# Patient Record
Sex: MALE | Race: WHITE | URBAN - METROPOLITAN AREA
[De-identification: names, ages, dates, MRNs, and addresses within clinical notes are randomized per-mention and may not be internally consistent; named-entity substitution may affect disease eponyms.]

---

## 2024-06-08 PROBLEM — F20.9 SCHIZOPHRENIA (HCC): Status: ACTIVE | Noted: 2024-06-08

## 2024-06-18 PROBLEM — F29 PSYCHOSIS, UNSPECIFIED PSYCHOSIS TYPE (HCC): Status: ACTIVE | Noted: 2024-06-18

## 2024-06-28 ENCOUNTER — HOSPITAL ENCOUNTER (OUTPATIENT)
Dept: POSTOP/PACU | Facility: HOSPITAL | Age: 59
Discharge: HOME | End: 2024-06-28
Attending: Other
Payer: MEDICARE

## 2024-06-28 ENCOUNTER — ANESTHESIA (OUTPATIENT)
Dept: POSTOP/PACU | Facility: HOSPITAL | Age: 59
End: 2024-06-28
Payer: MEDICARE

## 2024-06-28 ENCOUNTER — ANESTHESIA EVENT (OUTPATIENT)
Dept: POSTOP/PACU | Facility: HOSPITAL | Age: 59
End: 2024-06-28
Payer: MEDICARE

## 2024-06-28 VITALS
WEIGHT: 180.75 LBS | HEIGHT: 74 IN | DIASTOLIC BLOOD PRESSURE: 93 MMHG | SYSTOLIC BLOOD PRESSURE: 136 MMHG | RESPIRATION RATE: 12 BRPM | OXYGEN SATURATION: 98 % | BODY MASS INDEX: 23.2 KG/M2 | HEART RATE: 77 BPM | TEMPERATURE: 98 F

## 2024-06-28 DIAGNOSIS — F29 PSYCHOSIS, UNSPECIFIED PSYCHOSIS TYPE (HCC): ICD-10-CM

## 2024-06-28 DIAGNOSIS — F25.0 SCHIZOAFFECTIVE DISORDER, BIPOLAR TYPE (HCC): ICD-10-CM

## 2024-06-28 DIAGNOSIS — F32.A DEPRESSION: Primary | ICD-10-CM

## 2024-06-28 DIAGNOSIS — Z01.818 PRE-OP TESTING: ICD-10-CM

## 2024-06-28 RX ORDER — NICOTINE POLACRILEX 4 MG
15 LOZENGE BUCCAL
Status: DISCONTINUED | OUTPATIENT
Start: 2024-06-28 | End: 2024-06-28 | Stop reason: HOSPADM

## 2024-06-28 RX ORDER — KETOROLAC TROMETHAMINE 30 MG/ML
15 INJECTION, SOLUTION INTRAMUSCULAR; INTRAVENOUS ONCE
Status: COMPLETED | OUTPATIENT
Start: 2024-06-28 | End: 2024-06-28

## 2024-06-28 RX ORDER — KETAMINE HYDROCHLORIDE 50 MG/ML
INJECTION, SOLUTION INTRAMUSCULAR; INTRAVENOUS AS NEEDED
Status: DISCONTINUED | OUTPATIENT
Start: 2024-06-28 | End: 2024-06-28 | Stop reason: SURG

## 2024-06-28 RX ORDER — GLYCOPYRROLATE 0.2 MG/ML
0.1 INJECTION, SOLUTION INTRAMUSCULAR; INTRAVENOUS ONCE
Status: COMPLETED | OUTPATIENT
Start: 2024-06-28 | End: 2024-06-28

## 2024-06-28 RX ORDER — NALOXONE HYDROCHLORIDE 0.4 MG/ML
0.08 INJECTION, SOLUTION INTRAMUSCULAR; INTRAVENOUS; SUBCUTANEOUS AS NEEDED
Status: DISCONTINUED | OUTPATIENT
Start: 2024-06-28 | End: 2024-06-28 | Stop reason: HOSPADM

## 2024-06-28 RX ORDER — NICOTINE POLACRILEX 4 MG
30 LOZENGE BUCCAL
Status: DISCONTINUED | OUTPATIENT
Start: 2024-06-28 | End: 2024-06-30

## 2024-06-28 RX ORDER — ETOMIDATE 2 MG/ML
INJECTION INTRAVENOUS AS NEEDED
Status: DISCONTINUED | OUTPATIENT
Start: 2024-06-28 | End: 2024-06-28 | Stop reason: SURG

## 2024-06-28 RX ORDER — CAFFEINE AND SODIUM BENZOATE 125 MG/ML
250 INJECTION, SOLUTION INTRAMUSCULAR; INTRAVENOUS ONCE
Status: COMPLETED | OUTPATIENT
Start: 2024-06-28 | End: 2024-06-28

## 2024-06-28 RX ORDER — SODIUM CHLORIDE, SODIUM LACTATE, POTASSIUM CHLORIDE, CALCIUM CHLORIDE 600; 310; 30; 20 MG/100ML; MG/100ML; MG/100ML; MG/100ML
INJECTION, SOLUTION INTRAVENOUS CONTINUOUS
Status: DISCONTINUED | OUTPATIENT
Start: 2024-06-28 | End: 2024-06-30

## 2024-06-28 RX ORDER — KETOROLAC TROMETHAMINE 30 MG/ML
INJECTION, SOLUTION INTRAMUSCULAR; INTRAVENOUS
Status: COMPLETED
Start: 2024-06-28 | End: 2024-06-28

## 2024-06-28 RX ORDER — HYDROMORPHONE HYDROCHLORIDE 1 MG/ML
0.6 INJECTION, SOLUTION INTRAMUSCULAR; INTRAVENOUS; SUBCUTANEOUS EVERY 5 MIN PRN
Status: DISCONTINUED | OUTPATIENT
Start: 2024-06-28 | End: 2024-06-28 | Stop reason: HOSPADM

## 2024-06-28 RX ORDER — SODIUM CHLORIDE, SODIUM LACTATE, POTASSIUM CHLORIDE, CALCIUM CHLORIDE 600; 310; 30; 20 MG/100ML; MG/100ML; MG/100ML; MG/100ML
INJECTION, SOLUTION INTRAVENOUS CONTINUOUS
Status: DISCONTINUED | OUTPATIENT
Start: 2024-06-28 | End: 2024-06-28 | Stop reason: HOSPADM

## 2024-06-28 RX ORDER — CAFFEINE AND SODIUM BENZOATE 125 MG/ML
INJECTION, SOLUTION INTRAMUSCULAR; INTRAVENOUS
Status: COMPLETED
Start: 2024-06-28 | End: 2024-06-28

## 2024-06-28 RX ORDER — PROCHLORPERAZINE EDISYLATE 5 MG/ML
5 INJECTION INTRAMUSCULAR; INTRAVENOUS EVERY 8 HOURS PRN
Status: DISCONTINUED | OUTPATIENT
Start: 2024-06-28 | End: 2024-06-28 | Stop reason: HOSPADM

## 2024-06-28 RX ORDER — SODIUM CHLORIDE, SODIUM LACTATE, POTASSIUM CHLORIDE, CALCIUM CHLORIDE 600; 310; 30; 20 MG/100ML; MG/100ML; MG/100ML; MG/100ML
INJECTION, SOLUTION INTRAVENOUS CONTINUOUS PRN
Status: DISCONTINUED | OUTPATIENT
Start: 2024-06-28 | End: 2024-06-28 | Stop reason: SURG

## 2024-06-28 RX ORDER — DEXTROSE MONOHYDRATE 25 G/50ML
50 INJECTION, SOLUTION INTRAVENOUS
Status: DISCONTINUED | OUTPATIENT
Start: 2024-06-28 | End: 2024-06-28 | Stop reason: HOSPADM

## 2024-06-28 RX ORDER — MEPERIDINE HYDROCHLORIDE 25 MG/ML
12.5 INJECTION INTRAMUSCULAR; INTRAVENOUS; SUBCUTANEOUS AS NEEDED
Status: DISCONTINUED | OUTPATIENT
Start: 2024-06-28 | End: 2024-06-28 | Stop reason: HOSPADM

## 2024-06-28 RX ORDER — ONDANSETRON 2 MG/ML
4 INJECTION INTRAMUSCULAR; INTRAVENOUS EVERY 6 HOURS PRN
Status: DISCONTINUED | OUTPATIENT
Start: 2024-06-28 | End: 2024-06-28 | Stop reason: HOSPADM

## 2024-06-28 RX ORDER — HYDROMORPHONE HYDROCHLORIDE 1 MG/ML
0.2 INJECTION, SOLUTION INTRAMUSCULAR; INTRAVENOUS; SUBCUTANEOUS EVERY 5 MIN PRN
Status: DISCONTINUED | OUTPATIENT
Start: 2024-06-28 | End: 2024-06-28 | Stop reason: HOSPADM

## 2024-06-28 RX ORDER — ONDANSETRON 2 MG/ML
4 INJECTION INTRAMUSCULAR; INTRAVENOUS ONCE
Status: COMPLETED | OUTPATIENT
Start: 2024-06-28 | End: 2024-06-28

## 2024-06-28 RX ORDER — DEXTROSE MONOHYDRATE 25 G/50ML
50 INJECTION, SOLUTION INTRAVENOUS
Status: DISCONTINUED | OUTPATIENT
Start: 2024-06-28 | End: 2024-06-30

## 2024-06-28 RX ORDER — GLYCOPYRROLATE 0.2 MG/ML
INJECTION, SOLUTION INTRAMUSCULAR; INTRAVENOUS
Status: COMPLETED
Start: 2024-06-28 | End: 2024-06-28

## 2024-06-28 RX ORDER — NICOTINE POLACRILEX 4 MG
30 LOZENGE BUCCAL
Status: DISCONTINUED | OUTPATIENT
Start: 2024-06-28 | End: 2024-06-28 | Stop reason: HOSPADM

## 2024-06-28 RX ORDER — HYDROMORPHONE HYDROCHLORIDE 1 MG/ML
0.4 INJECTION, SOLUTION INTRAMUSCULAR; INTRAVENOUS; SUBCUTANEOUS EVERY 5 MIN PRN
Status: DISCONTINUED | OUTPATIENT
Start: 2024-06-28 | End: 2024-06-28 | Stop reason: HOSPADM

## 2024-06-28 RX ORDER — NICOTINE POLACRILEX 4 MG
15 LOZENGE BUCCAL
Status: DISCONTINUED | OUTPATIENT
Start: 2024-06-28 | End: 2024-06-30

## 2024-06-28 RX ORDER — ONDANSETRON 2 MG/ML
INJECTION INTRAMUSCULAR; INTRAVENOUS
Status: COMPLETED
Start: 2024-06-28 | End: 2024-06-28

## 2024-06-28 RX ORDER — LABETALOL HYDROCHLORIDE 5 MG/ML
INJECTION, SOLUTION INTRAVENOUS AS NEEDED
Status: DISCONTINUED | OUTPATIENT
Start: 2024-06-28 | End: 2024-06-28 | Stop reason: SURG

## 2024-06-28 RX ADMIN — GLYCOPYRROLATE 0.1 MG: 0.2 INJECTION, SOLUTION INTRAMUSCULAR; INTRAVENOUS at 06:39:00

## 2024-06-28 RX ADMIN — LABETALOL HYDROCHLORIDE 10 MG: 5 INJECTION, SOLUTION INTRAVENOUS at 06:56:00

## 2024-06-28 RX ADMIN — SODIUM CHLORIDE, SODIUM LACTATE, POTASSIUM CHLORIDE, CALCIUM CHLORIDE: 600; 310; 30; 20 INJECTION, SOLUTION INTRAVENOUS at 06:20:00

## 2024-06-28 RX ADMIN — SODIUM CHLORIDE, SODIUM LACTATE, POTASSIUM CHLORIDE, CALCIUM CHLORIDE: 600; 310; 30; 20 INJECTION, SOLUTION INTRAVENOUS at 06:24:00

## 2024-06-28 RX ADMIN — ETOMIDATE 16 MG: 2 INJECTION INTRAVENOUS at 06:47:00

## 2024-06-28 RX ADMIN — KETOROLAC TROMETHAMINE 15 MG: 30 INJECTION, SOLUTION INTRAMUSCULAR; INTRAVENOUS at 06:45:00

## 2024-06-28 RX ADMIN — ONDANSETRON 4 MG: 2 INJECTION INTRAMUSCULAR; INTRAVENOUS at 06:45:00

## 2024-06-28 RX ADMIN — CAFFEINE AND SODIUM BENZOATE 250 MG: 125 INJECTION, SOLUTION INTRAMUSCULAR; INTRAVENOUS at 06:40:00

## 2024-06-28 RX ADMIN — KETAMINE HYDROCHLORIDE 25 MG: 50 INJECTION, SOLUTION INTRAMUSCULAR; INTRAVENOUS at 06:47:00

## 2024-06-28 RX ADMIN — SODIUM CHLORIDE, SODIUM LACTATE, POTASSIUM CHLORIDE, CALCIUM CHLORIDE 1000 ML: 600; 310; 30; 20 INJECTION, SOLUTION INTRAVENOUS at 06:36:00

## 2024-06-28 NOTE — ANESTHESIA POSTPROCEDURE EVALUATION
[facility]    Dylon Perla Patient Status:  Outpatient   Age/Gender 58 year old male MRN [id number]   Location [facility] POST ANESTHESIA CARE UNIT Attending Dameon Back MD   Hosp Day # 0 PCP No primary care provider on file.       Anesthesia Post-op Note        Procedure Summary       Date: 06/28/24 Room / Location: [facility] Post Anesthesia Care Unit    Anesthesia Start: 0640 Anesthesia Stop: 0654    Procedure: ECT(BEDSIDE) Diagnosis: Psychosis, unspecified psychosis type ([facility])    Scheduled Providers:  Anesthesiologist: Cedric Griffith MD    Anesthesia Type: general ASA Status: 2            Anesthesia Type: general    Vitals Value Taken Time   /112 06/28/24 0654   Temp 98 °F (36.7 °C) 06/28/24 0654   Pulse 113 06/28/24 0654   Resp 16 06/28/24 0654   SpO2 100 % 06/28/24 0654       Patient Location: PACU    Anesthesia Type: general    Airway Patency: patent    Postop Pain Control: adequate    Mental Status: mildly sedated but able to meaningfully participate in the post-anesthesia evaluation    Nausea/Vomiting: none    Cardiopulmonary/Hydration status: stable euvolemic    Complications: no apparent anesthesia related complications    Postop vital signs: stable    Dental Exam: Unchanged from Preop

## 2024-06-28 NOTE — ANESTHESIA PREPROCEDURE EVALUATION
PRE-OP EVALUATION    Patient Name: Dylon Perla    Admit Diagnosis: Psychosis, unspecified psychosis type (HCC) [F29]    Pre-op Diagnosis: * No surgery found *        Anesthesia Procedure: ECT(BEDSIDE)    * Surgery not found *    Pre-op vitals reviewed.        Body mass index is 23.11 kg/m².    Current medications reviewed.  Hospital Medications:   [COMPLETED] ondansetron (Zofran) 4 MG/2ML injection        [COMPLETED] ketorolac (Toradol) 30 MG/ML injection        [COMPLETED] glycopyrrolate (Robinul) 0.2 MG/ML injection        [COMPLETED] caffeine-sodium benzoate 125-125 MG/ML injection        [COMPLETED] acetaminophen (Tylenol Extra Strength) tab 1,000 mg  1,000 mg Oral Once    haloperidol (Haldol) tab 10 mg  10 mg Oral QAM    haloperidol (Haldol) tab 15 mg  15 mg Oral Nightly    cloZAPine (Clozaril) tab 100 mg  100 mg Oral Daily    cloZAPine (Clozaril) tab 600 mg  600 mg Oral Nightly    sertraline (Zoloft) tab 50 mg  50 mg Oral Daily    benztropine (Cogentin) tab 2 mg  2 mg Oral Q4H PRN    Or    benztropine mesylate (Cogentin) 1 mg/mL injection 2 mg  2 mg Intramuscular Q4H PRN    haloperidol (Haldol) tab 5 mg  5 mg Oral Q4H PRN    Or    haloperidol lactate (Haldol) 5 MG/ML injection 5 mg  5 mg Intramuscular Q4H PRN    LORazepam (Ativan) tab 2 mg  2 mg Oral Q4H PRN    Or    LORazepam (Ativan) 2 mg/mL injection 2 mg  2 mg Intramuscular Q4H PRN    magnesium hydroxide (Milk of Magnesia) 400 MG/5ML oral suspension 30 mL  30 mL Oral Nightly PRN    alum-mag hydroxide-simethicone (Maalox) 200-200-20 MG/5ML oral suspension 30 mL  30 mL Oral Q4H PRN    acetaminophen (Tylenol) tab 325 mg  325 mg Oral Q4H PRN    Or    acetaminophen (Tylenol) tab 650 mg  650 mg Oral Q4H PRN    propranolol (Inderal) tab 10 mg  10 mg Oral TID    topiramate (TopaMAX) tab 200 mg  200 mg Oral Daily    atorvastatin (Lipitor) tab 40 mg  40 mg Oral Nightly    lisinopril (Prinivil; Zestril) tab 20 mg  20 mg Oral Daily    metFORMIN (Glucophage) tab  500 mg  500 mg Oral Daily with breakfast       Outpatient Medications:   (Not in a hospital admission)      Allergies: Latex and Penicillins      Anesthesia Evaluation    Patient summary reviewed.    Anesthetic Complications           GI/Hepatic/Renal                                 Cardiovascular                  (+) hypertension                                     Endo/Other      (+) diabetes                            Pulmonary                           Neuro/Psych                     (+) psychiatric history                 Past Surgical History:   Procedure Laterality Date    Colonoscopy       Social History     Socioeconomic History    Marital status: Single   Tobacco Use    Smoking status: Never    Smokeless tobacco: Never   Vaping Use    Vaping status: Never Used   Substance and Sexual Activity    Alcohol use: Never    Drug use: Never     History   Drug Use Unknown     Available pre-op labs reviewed.  Lab Results   Component Value Date    WBC 8.7 06/27/2024    RBC 4.82 06/27/2024    HGB 14.6 06/27/2024    HCT 42.7 06/27/2024    MCV 88.6 06/27/2024    MCH 30.3 06/27/2024    MCHC 34.2 06/27/2024    RDW 13.0 06/27/2024    .0 (L) 06/27/2024     Lab Results   Component Value Date     06/27/2024    K 3.6 06/27/2024     06/27/2024    CO2 28.0 06/27/2024    BUN 17 06/27/2024    CREATSERUM 1.05 06/27/2024     (H) 06/27/2024    CA 9.0 06/27/2024            Airway      Mallampati: II  Mouth opening: 3 FB  TM distance: 4 - 6 cm  Neck ROM: full Cardiovascular    Cardiovascular exam normal.  Rhythm: regular  Rate: normal     Dental             Pulmonary    Pulmonary exam normal.                 Other findings              ASA: 2   Plan: general  NPO status verified and           Plan/risks discussed with: patient                Present on Admission:  **None**

## 2024-06-28 NOTE — PROGRESS NOTES
[facility]  ECT History & Physical    Dlyon Perla Patient Status:  Outpatient   Age/Gender 58 year old male MRN [id number]   Location [facility] POST ANESTHESIA CARE UNIT Attending Dameon Back MD   Hosp Day # 0 PCP No primary care provider on file.     Date: 6/28/2024    Diagnosis: Schizoaffective disorder bipolar type    Procedure:  ECT    HPI:     Patient seen.  Patient noted starting ECT secondary to recurrent severe depression, anxiety, and mood instability.  Patient agreeable for ECT.      Medical History:  Past Medical History:    Diabetes (HCC)    Diabetes mellitus (HCC)    Non-insulin dependent    Essential hypertension    High blood pressure    High cholesterol    Hyperlipidemia       Surgical History:  Past Surgical History:   Procedure Laterality Date    Colonoscopy         Family History:  Family History   Family history unknown: Yes       ROS:  Unremarkable    Physical Exam:   BP (!) 136/93   Pulse 77   Temp 98 °F (36.7 °C)   Resp 12   Ht 74\"   Wt 82 kg (180 lb 12.4 oz)   SpO2 98%   BMI 23.21 kg/m²     General Appearance:    Alert, cooperative, no distress, appears stated age   Head:    Normocephalic, without obvious abnormality, atraumatic   Eyes:    PERRL, conjunctiva/corneas clear, EOM's intact       Nose:   Nares normal, septum midline, mucosa normal, no drainage    or sinus tenderness   Throat:   Lips, mucosa, and tongue normal; teeth and gums normal   Neck:   Supple, symmetrical, trachea midline   Lungs:     Clear to auscultation bilaterally, respirations unlabored    Heart:    Regular rate and rhythm, S1 and S2 normal, no murmur, rub   or gallop   Abdomen:     Soft, non-tender, bowel sounds active all four quadrants,     no masses, no organomegaly   Extremities:   Extremities normal, atraumatic, no cyanosis or edema   Pulses:   2+ and symmetric all extremities   Skin:   Skin color, texture, turgor normal, no rashes or lesions   Neurologic:   CNII-XII intact,  normal strength, sensation and reflexes     throughout     Impressions & Plans: Schizoaffective disorder bipolar type.  Bitemporal ECT    I have discussed the risks and benefits and alternatives with the patient/family.  They understand and agree to proceed with plan of care.    Dameon Back MD

## 2024-06-28 NOTE — PROGRESS NOTES
[facility]  ECT Procedure Note    Dylon Perla Patient Status:  Outpatient   Age/Gender 58 year old male MRN [id number]   Location [facility] POST ANESTHESIA CARE UNIT Attending Dameon Back MD   Hosp Day # 0 PCP No primary care provider on file.     6/28/2024    ECT Number: #1    Diagnosis: Schizoaffective Disorder Bipolar Type. F25.0    Type of ECT:  Bitemporal    Place of Service:  Inpatient    Settings:   1.  Energy Percentage: 70%    2.  Program:  Low 0.5    Pre-ECT Evaluation    Symptoms:  Patient seen.  Patient noted to have periods of mood instability, anxiety, depression, and extremely poor functioning.  Patient agreeable for ECT.  Patient shows capacity for ECT.  Discussed risks and benefits.  Discussed planning on 3 ECT next week.    Risk/Benefits:  Discussed with patient side effects of ECT including headache, teeth, jaw, cardiac, pulmonary, NPO, aspiration, allergic reactions, anesthetic reactions, musculoskeletal, neurologic, morbidity/mortality, potential lack of efficacy, unilateral/bilateral ECT, relapse/maintenance issues, cognitive risks including memory, concentration, cognition, and other risks.    Side Effects:  Patient notes no cognitive/physical complaints    Exam:  Mood: depressed, anxious, and irritable  Affect: Labile  Memory:  intact immediate, recent, remote and as evidenced by ability to present consistent history  Concentration:   distracted  Suicidal ideation: no suicidal ideation    Prior to procedure, reviewed with treatment team correct patient, time of procedure and type of ECT.  Also reviewed with anesthesia pre-ECT medications.    Patient Monitored:  B/P, EKG, EEG, Pulse Ox, Left Ankle Cuff    Pre-ECT Medications: Robinul 0.1 mg IV, Toradol 15 mg IV, Zofran 4 mg IV, and Caffeine 250 mg IV.  Post-ECT hypertension and consider giving hydralazine 10 mg pretreatment next treatment    ECT Medications:  Anesthetic  Etomidate 16 mg IV followed by  ketamine 25 mg IV and Succinylcholine 100 mg IV.  Patient was somewhat prolonged paralysis and consider decreasing succinylcholine to 80 mg next treatment    Seizure Duration:  Motor: 41 seconds       EE seconds    Post-ECT Condition: Post ECT hypertension and mild prolongation of paralysis.  As above.    Post-ECT Medications:   Propofol 50 mg IV and labetalol 10 mg IV    Dameon Back MD

## 2024-07-01 ENCOUNTER — ANESTHESIA EVENT (OUTPATIENT)
Dept: POSTOP/PACU | Facility: HOSPITAL | Age: 59
End: 2024-07-01
Payer: MEDICARE

## 2024-07-01 ENCOUNTER — ANESTHESIA (OUTPATIENT)
Dept: POSTOP/PACU | Facility: HOSPITAL | Age: 59
End: 2024-07-01
Payer: MEDICARE

## 2024-07-01 RX ORDER — KETAMINE HYDROCHLORIDE 50 MG/ML
INJECTION, SOLUTION INTRAMUSCULAR; INTRAVENOUS AS NEEDED
Status: DISCONTINUED | OUTPATIENT
Start: 2024-07-01 | End: 2024-07-01 | Stop reason: SURG

## 2024-07-01 RX ORDER — LABETALOL HYDROCHLORIDE 5 MG/ML
INJECTION, SOLUTION INTRAVENOUS AS NEEDED
Status: DISCONTINUED | OUTPATIENT
Start: 2024-07-01 | End: 2024-07-01 | Stop reason: SURG

## 2024-07-01 RX ORDER — ETOMIDATE 2 MG/ML
INJECTION INTRAVENOUS AS NEEDED
Status: DISCONTINUED | OUTPATIENT
Start: 2024-07-01 | End: 2024-07-01 | Stop reason: SURG

## 2024-07-01 RX ADMIN — KETAMINE HYDROCHLORIDE 25 MG: 50 INJECTION, SOLUTION INTRAMUSCULAR; INTRAVENOUS at 06:49:00

## 2024-07-01 RX ADMIN — ETOMIDATE 16 MG: 2 INJECTION INTRAVENOUS at 06:49:00

## 2024-07-01 RX ADMIN — LABETALOL HYDROCHLORIDE 10 MG: 5 INJECTION, SOLUTION INTRAVENOUS at 06:53:00

## 2024-07-01 NOTE — ANESTHESIA PREPROCEDURE EVALUATION
PRE-OP EVALUATION    Patient Name: Dylon Perla    Admit Diagnosis: Psychosis, unspecified psychosis type (HCC) [F29]    Pre-op Diagnosis: * No surgery found *        Anesthesia Procedure: ECT(BEDSIDE)    * Surgery not found *    Pre-op vitals reviewed.  Temp: 98.2 °F (36.8 °C)  Pulse: 89  Resp: 14  BP: 139/102  SpO2: 98 %  Body mass index is 23.21 kg/m².    Current medications reviewed.  Hospital Medications:   glucose (Dex4) 15 GM/59ML oral liquid 15 g  15 g Oral Q15 Min PRN    Or    glucose (Glutose) 40% oral gel 15 g  15 g Oral Q15 Min PRN    Or    glucose-vitamin C (Dex-4) chewable tab 4 tablet  4 tablet Oral Q15 Min PRN    Or    dextrose 50% injection 50 mL  50 mL Intravenous Q15 Min PRN    Or    glucose (Dex4) 15 GM/59ML oral liquid 30 g  30 g Oral Q15 Min PRN    Or    glucose (Glutose) 40% oral gel 30 g  30 g Oral Q15 Min PRN    Or    glucose-vitamin C (Dex-4) chewable tab 8 tablet  8 tablet Oral Q15 Min PRN    lactated ringers infusion   Intravenous Continuous    [COMPLETED] glycopyrrolate (Robinul) 0.2 MG/ML injection 0.1 mg  0.1 mg Intravenous Once    [COMPLETED] ketorolac (Toradol) 30 MG/ML injection 15 mg  15 mg Intravenous Once    [COMPLETED] ondansetron (Zofran) 4 MG/2ML injection 4 mg  4 mg Intravenous Once    [COMPLETED] caffeine-sodium benzoate 125-125 mg/mL injection  250 mg Intravenous Once    [COMPLETED] hydrALAzine (Apresoline) 20 mg/mL injection 10 mg  10 mg Intravenous Once    traZODone (Desyrel) tab 100 mg  100 mg Oral Nightly    [COMPLETED] ketamine (Ketalar) 50 MG/ML injection        [COMPLETED] acetaminophen (Tylenol Extra Strength) tab 1,000 mg  1,000 mg Oral Once    haloperidol (Haldol) tab 10 mg  10 mg Oral QAM    haloperidol (Haldol) tab 15 mg  15 mg Oral Nightly    cloZAPine (Clozaril) tab 100 mg  100 mg Oral Daily    cloZAPine (Clozaril) tab 600 mg  600 mg Oral Nightly    sertraline (Zoloft) tab 50 mg  50 mg Oral Daily    benztropine (Cogentin) tab 2 mg  2 mg Oral Q4H PRN    Or     benztropine mesylate (Cogentin) 1 mg/mL injection 2 mg  2 mg Intramuscular Q4H PRN    haloperidol (Haldol) tab 5 mg  5 mg Oral Q4H PRN    Or    haloperidol lactate (Haldol) 5 MG/ML injection 5 mg  5 mg Intramuscular Q4H PRN    LORazepam (Ativan) tab 2 mg  2 mg Oral Q4H PRN    Or    LORazepam (Ativan) 2 mg/mL injection 2 mg  2 mg Intramuscular Q4H PRN    magnesium hydroxide (Milk of Magnesia) 400 MG/5ML oral suspension 30 mL  30 mL Oral Nightly PRN    alum-mag hydroxide-simethicone (Maalox) 200-200-20 MG/5ML oral suspension 30 mL  30 mL Oral Q4H PRN    acetaminophen (Tylenol) tab 325 mg  325 mg Oral Q4H PRN    Or    acetaminophen (Tylenol) tab 650 mg  650 mg Oral Q4H PRN    propranolol (Inderal) tab 10 mg  10 mg Oral TID    topiramate (TopaMAX) tab 200 mg  200 mg Oral Daily    atorvastatin (Lipitor) tab 40 mg  40 mg Oral Nightly    lisinopril (Prinivil; Zestril) tab 20 mg  20 mg Oral Daily    metFORMIN (Glucophage) tab 500 mg  500 mg Oral Daily with breakfast       Outpatient Medications:   (Not in a hospital admission)      Allergies: Latex and Penicillins      Anesthesia Evaluation    Patient summary reviewed.    Anesthetic Complications           GI/Hepatic/Renal                                 Cardiovascular                  (+) hypertension                                     Endo/Other      (+) diabetes                            Pulmonary                           Neuro/Psych                     (+) psychiatric history                 Past Surgical History:   Procedure Laterality Date    Colonoscopy       Social History     Socioeconomic History    Marital status: Single   Tobacco Use    Smoking status: Never    Smokeless tobacco: Never   Vaping Use    Vaping status: Never Used   Substance and Sexual Activity    Alcohol use: Never    Drug use: Never     History   Drug Use Unknown     Available pre-op labs reviewed.  Lab Results   Component Value Date    WBC 7.4 06/30/2024    RBC 4.56 06/30/2024    HGB 13.6  06/30/2024    HCT 40.2 06/30/2024    MCV 88.2 06/30/2024    MCH 29.8 06/30/2024    MCHC 33.8 06/30/2024    RDW 12.8 06/30/2024    .0 (L) 06/30/2024     Lab Results   Component Value Date     06/27/2024    K 3.6 06/27/2024     06/27/2024    CO2 28.0 06/27/2024    BUN 17 06/27/2024    CREATSERUM 1.05 06/27/2024     (H) 06/27/2024    CA 9.0 06/27/2024            Airway      Mallampati: II  Mouth opening: 3 FB  TM distance: 4 - 6 cm  Neck ROM: full Cardiovascular    Cardiovascular exam normal.  Rhythm: regular  Rate: normal     Dental             Pulmonary    Pulmonary exam normal.                 Other findings              ASA: 3   Plan: general  NPO status verified and patient meets guidelines.  Patient has taken beta blockers in last 24 hours.      Comment: All anesthetic related questions were addressed.  Pt expressed understanding of and agreement with the anesthetic plan.      Plan/risks discussed with: patient                Present on Admission:  **None**

## 2024-07-01 NOTE — ANESTHESIA POSTPROCEDURE EVALUATION
[facility]    Dylon Perla Patient Status:  Outpatient   Age/Gender 58 year old male MRN [id number]   Location [facility] POST ANESTHESIA CARE UNIT Attending Dameon Back MD   Hosp Day # 0 PCP No primary care provider on file.       Anesthesia Post-op Note        Procedure Summary       Date: 07/01/24 Room / Location: [facility] Post Anesthesia Care Unit    Anesthesia Start: 0648 Anesthesia Stop: 0700    Procedure: ECT(BEDSIDE) Diagnosis: Psychosis, unspecified psychosis type ([facility])    Scheduled Providers:  Responsible Provider: Chet Pleitez MD    Anesthesia Type: general ASA Status: 3            Anesthesia Type: general    Vitals Value Taken Time   /97 07/01/24 0700   Temp  07/01/24 0701   Pulse 83 07/01/24 0700   Resp 10 07/01/24 0700   SpO2 97 % 07/01/24 0700       Patient Location: PACU    Anesthesia Type: general    Airway Patency: patent and extubated    Postop Pain Control: adequate    Mental Status: mildly sedated but able to meaningfully participate in the post-anesthesia evaluation    Nausea/Vomiting: none    Cardiopulmonary/Hydration status: stable euvolemic    Complications: no apparent anesthesia related complications    Postop vital signs: stable    Dental Exam: Unchanged from Preop    Patient to be discharged from PACU when criteria met.

## 2024-07-03 ENCOUNTER — ANESTHESIA (OUTPATIENT)
Dept: POSTOP/PACU | Facility: HOSPITAL | Age: 59
End: 2024-07-03
Payer: MEDICARE

## 2024-07-03 ENCOUNTER — ANESTHESIA EVENT (OUTPATIENT)
Dept: POSTOP/PACU | Facility: HOSPITAL | Age: 59
End: 2024-07-03
Payer: MEDICARE

## 2024-07-03 RX ORDER — ETOMIDATE 2 MG/ML
INJECTION INTRAVENOUS AS NEEDED
Status: DISCONTINUED | OUTPATIENT
Start: 2024-07-03 | End: 2024-07-03 | Stop reason: SURG

## 2024-07-03 RX ORDER — SODIUM CHLORIDE, SODIUM LACTATE, POTASSIUM CHLORIDE, CALCIUM CHLORIDE 600; 310; 30; 20 MG/100ML; MG/100ML; MG/100ML; MG/100ML
INJECTION, SOLUTION INTRAVENOUS CONTINUOUS PRN
Status: DISCONTINUED | OUTPATIENT
Start: 2024-07-03 | End: 2024-07-03 | Stop reason: SURG

## 2024-07-03 RX ORDER — KETAMINE HYDROCHLORIDE 50 MG/ML
INJECTION, SOLUTION INTRAMUSCULAR; INTRAVENOUS AS NEEDED
Status: DISCONTINUED | OUTPATIENT
Start: 2024-07-03 | End: 2024-07-03 | Stop reason: SURG

## 2024-07-03 RX ADMIN — ETOMIDATE 16 MG: 2 INJECTION INTRAVENOUS at 06:50:00

## 2024-07-03 RX ADMIN — KETAMINE HYDROCHLORIDE 25 MG: 50 INJECTION, SOLUTION INTRAMUSCULAR; INTRAVENOUS at 06:50:00

## 2024-07-03 RX ADMIN — SODIUM CHLORIDE, SODIUM LACTATE, POTASSIUM CHLORIDE, CALCIUM CHLORIDE: 600; 310; 30; 20 INJECTION, SOLUTION INTRAVENOUS at 06:40:00

## 2024-07-03 NOTE — ANESTHESIA PREPROCEDURE EVALUATION
PRE-OP EVALUATION    Patient Name: Dylon Perla    Admit Diagnosis: Psychosis, unspecified psychosis type ([facility]) [F29]    Pre-op Diagnosis: * No surgery found *        Anesthesia Procedure: ECT(BEDSIDE)    * Surgery not found *    Pre-op vitals reviewed.  Temp: 97.6 °F (36.4 °C)  Pulse: 83  Resp: 16  BP: 135/89  SpO2: 97 %  Body mass index is 23.88 kg/m².    Current medications reviewed.  Hospital Medications:   glucose (Dex4) 15 GM/59ML oral liquid 15 g  15 g Oral Q15 Min PRN    Or    glucose (Glutose) 40% oral gel 15 g  15 g Oral Q15 Min PRN    Or    glucose-vitamin C (Dex-4) chewable tab 4 tablet  4 tablet Oral Q15 Min PRN    Or    dextrose 50% injection 50 mL  50 mL Intravenous Q15 Min PRN    Or    glucose (Dex4) 15 GM/59ML oral liquid 30 g  30 g Oral Q15 Min PRN    Or    glucose (Glutose) 40% oral gel 30 g  30 g Oral Q15 Min PRN    Or    glucose-vitamin C (Dex-4) chewable tab 8 tablet  8 tablet Oral Q15 Min PRN    lactated ringers infusion   Intravenous Continuous    lactated ringers infusion   Intravenous Continuous    [COMPLETED] glycopyrrolate (Robinul) 0.2 MG/ML injection 0.1 mg  0.1 mg Intravenous Once    [COMPLETED] ketorolac (Toradol) 30 MG/ML injection 15 mg  15 mg Intravenous Once    [COMPLETED] ondansetron (Zofran) 4 MG/2ML injection 4 mg  4 mg Intravenous Once    [COMPLETED] caffeine-sodium benzoate 125-125 mg/mL injection  500 mg Intravenous Once    [COMPLETED] hydrALAzine (Apresoline) 20 mg/mL injection 20 mg  20 mg Intravenous Once    [COMPLETED] acetaminophen (Tylenol Extra Strength) tab 1,000 mg  1,000 mg Oral Once    traZODone (Desyrel) tab 100 mg  100 mg Oral Nightly    haloperidol (Haldol) tab 10 mg  10 mg Oral QAM    haloperidol (Haldol) tab 15 mg  15 mg Oral Nightly    cloZAPine (Clozaril) tab 100 mg  100 mg Oral Daily    cloZAPine (Clozaril) tab 600 mg  600 mg Oral Nightly    sertraline (Zoloft) tab 50 mg  50 mg Oral Daily    benztropine (Cogentin) tab 2 mg  2 mg Oral Q4H PRN    Or     benztropine mesylate (Cogentin) 1 mg/mL injection 2 mg  2 mg Intramuscular Q4H PRN    haloperidol (Haldol) tab 5 mg  5 mg Oral Q4H PRN    Or    haloperidol lactate (Haldol) 5 MG/ML injection 5 mg  5 mg Intramuscular Q4H PRN    LORazepam (Ativan) tab 2 mg  2 mg Oral Q4H PRN    Or    LORazepam (Ativan) 2 mg/mL injection 2 mg  2 mg Intramuscular Q4H PRN    magnesium hydroxide (Milk of Magnesia) 400 MG/5ML oral suspension 30 mL  30 mL Oral Nightly PRN    alum-mag hydroxide-simethicone (Maalox) 200-200-20 MG/5ML oral suspension 30 mL  30 mL Oral Q4H PRN    acetaminophen (Tylenol) tab 325 mg  325 mg Oral Q4H PRN    Or    acetaminophen (Tylenol) tab 650 mg  650 mg Oral Q4H PRN    propranolol (Inderal) tab 10 mg  10 mg Oral TID    topiramate (TopaMAX) tab 200 mg  200 mg Oral Daily    atorvastatin (Lipitor) tab 40 mg  40 mg Oral Nightly    lisinopril (Prinivil; Zestril) tab 20 mg  20 mg Oral Daily    metFORMIN (Glucophage) tab 500 mg  500 mg Oral Daily with breakfast       Outpatient Medications:   (Not in a hospital admission)      Allergies: Latex and Penicillins      Anesthesia Evaluation    Patient summary reviewed.    Anesthetic Complications           GI/Hepatic/Renal                                 Cardiovascular                  (+) hypertension                                     Endo/Other      (+) diabetes                            Pulmonary                           Neuro/Psych                     (+) psychiatric history                 Past Surgical History:   Procedure Laterality Date    Colonoscopy       Social History     Socioeconomic History    Marital status: Single   Tobacco Use    Smoking status: Never    Smokeless tobacco: Never   Vaping Use    Vaping status: Never Used   Substance and Sexual Activity    Alcohol use: Never    Drug use: Never     History   Drug Use Unknown     Available pre-op labs reviewed.  Lab Results   Component Value Date    WBC 7.4 06/30/2024    RBC 4.56 06/30/2024    HGB 13.6  06/30/2024    HCT 40.2 06/30/2024    MCV 88.2 06/30/2024    MCH 29.8 06/30/2024    MCHC 33.8 06/30/2024    RDW 12.8 06/30/2024    .0 (L) 06/30/2024     Lab Results   Component Value Date     06/27/2024    K 3.6 06/27/2024     06/27/2024    CO2 28.0 06/27/2024    BUN 17 06/27/2024    CREATSERUM 1.05 06/27/2024     (H) 06/27/2024    CA 9.0 06/27/2024            Airway      Mallampati: II  Mouth opening: 3 FB  TM distance: 4 - 6 cm  Neck ROM: full Cardiovascular    Cardiovascular exam normal.  Rhythm: regular  Rate: normal     Dental             Pulmonary    Pulmonary exam normal.                 Other findings              ASA: 3   Plan: general  NPO status verified and patient meets guidelines.  Patient has taken beta blockers in last 24 hours.      Comment: All anesthetic related questions were addressed.  Pt expressed understanding of and agreement with the anesthetic plan.      Plan/risks discussed with: patient                Present on Admission:  **None**

## 2024-07-05 ENCOUNTER — ANESTHESIA EVENT (OUTPATIENT)
Dept: POSTOP/PACU | Facility: HOSPITAL | Age: 59
End: 2024-07-05
Payer: MEDICARE

## 2024-07-05 ENCOUNTER — ANESTHESIA (OUTPATIENT)
Dept: POSTOP/PACU | Facility: HOSPITAL | Age: 59
End: 2024-07-05
Payer: MEDICARE

## 2024-07-05 RX ORDER — LABETALOL HYDROCHLORIDE 5 MG/ML
INJECTION, SOLUTION INTRAVENOUS AS NEEDED
Status: DISCONTINUED | OUTPATIENT
Start: 2024-07-05 | End: 2024-07-05 | Stop reason: SURG

## 2024-07-05 RX ORDER — ETOMIDATE 2 MG/ML
INJECTION INTRAVENOUS AS NEEDED
Status: DISCONTINUED | OUTPATIENT
Start: 2024-07-05 | End: 2024-07-05 | Stop reason: SURG

## 2024-07-05 RX ADMIN — LABETALOL HYDROCHLORIDE 10 MG: 5 INJECTION, SOLUTION INTRAVENOUS at 06:54:00

## 2024-07-05 RX ADMIN — SODIUM CHLORIDE, SODIUM LACTATE, POTASSIUM CHLORIDE, CALCIUM CHLORIDE: 600; 310; 30; 20 INJECTION, SOLUTION INTRAVENOUS at 07:02:00

## 2024-07-05 RX ADMIN — ETOMIDATE 16 MG: 2 INJECTION INTRAVENOUS at 06:49:00

## 2024-07-05 NOTE — ANESTHESIA POSTPROCEDURE EVALUATION
[facility]    Dylon Perla Patient Status:  Outpatient   Age/Gender 58 year old male MRN [id number]   Location [facility] POST ANESTHESIA CARE UNIT Attending Dameon Back MD   Hosp Day # 0 PCP No primary care provider on file.       Anesthesia Post-op Note        Procedure Summary       Date: 07/05/24 Room / Location: [facility] Post Anesthesia Care Unit    Anesthesia Start: 0648 Anesthesia Stop: 0702    Procedure: ECT(BEDSIDE) Diagnosis: Psychosis, unspecified psychosis type ([facility])    Scheduled Providers: Arnaud Parnell MD Anesthesiologist: Donnell Oreilly MD    Anesthesia Type: general ASA Status: 2            Anesthesia Type: general    Vitals Value Taken Time   /76 07/05/24 0702   Temp n 07/05/24 0702   Pulse 79 07/05/24 0702   Resp 14 07/05/24 0702   SpO2 99 07/05/24 0702       Patient Location: PACU    Anesthesia Type: general    Airway Patency: patent    Postop Pain Control: adequate    Mental Status: mildly sedated but able to meaningfully participate in the post-anesthesia evaluation    Nausea/Vomiting: none    Cardiopulmonary/Hydration status: stable euvolemic    Complications: no apparent anesthesia related complications    Postop vital signs: stable    Dental Exam: Unchanged from Preop

## 2024-07-05 NOTE — ANESTHESIA PREPROCEDURE EVALUATION
PRE-OP EVALUATION    Patient Name: Dylon Perla    Admit Diagnosis: Psychosis, unspecified psychosis type ([facility]) [F29]    Pre-op Diagnosis: * No surgery found *        Anesthesia Procedure: ECT(BEDSIDE)    * Surgery not found *    Pre-op vitals reviewed.  Temp: 98.2 °F (36.8 °C)  Pulse: 90  Resp: 14  BP: 150/104  SpO2: 99 %  Body mass index is 24.77 kg/m².    Current medications reviewed.  Hospital Medications:  • glucose (Dex4) 15 GM/59ML oral liquid 15 g  15 g Oral Q15 Min PRN    Or   • glucose (Glutose) 40% oral gel 15 g  15 g Oral Q15 Min PRN    Or   • glucose-vitamin C (Dex-4) chewable tab 4 tablet  4 tablet Oral Q15 Min PRN    Or   • dextrose 50% injection 50 mL  50 mL Intravenous Q15 Min PRN    Or   • glucose (Dex4) 15 GM/59ML oral liquid 30 g  30 g Oral Q15 Min PRN    Or   • glucose (Glutose) 40% oral gel 30 g  30 g Oral Q15 Min PRN    Or   • glucose-vitamin C (Dex-4) chewable tab 8 tablet  8 tablet Oral Q15 Min PRN   • lactated ringers infusion   Intravenous Continuous   • lactated ringers infusion   Intravenous Continuous   • [COMPLETED] glycopyrrolate (Robinul) 0.2 MG/ML injection 0.1 mg  0.1 mg Intravenous Once   • [COMPLETED] ketorolac (Toradol) 30 MG/ML injection 15 mg  15 mg Intravenous Once   • [COMPLETED] ondansetron (Zofran) 4 MG/2ML injection 4 mg  4 mg Intravenous Once   • [COMPLETED] caffeine-sodium benzoate 125-125 mg/mL injection  500 mg Intravenous Once   • [COMPLETED] hydrALAzine (Apresoline) 20 mg/mL injection 20 mg  20 mg Intravenous Once   • [COMPLETED] acetaminophen (Tylenol Extra Strength) tab 1,000 mg  1,000 mg Oral Once   • traZODone (Desyrel) tab 100 mg  100 mg Oral Nightly   • haloperidol (Haldol) tab 10 mg  10 mg Oral QAM   • haloperidol (Haldol) tab 15 mg  15 mg Oral Nightly   • cloZAPine (Clozaril) tab 100 mg  100 mg Oral Daily   • cloZAPine (Clozaril) tab 600 mg  600 mg Oral Nightly   • sertraline (Zoloft) tab 50 mg  50 mg Oral Daily   • benztropine (Cogentin) tab 2 mg  2 mg  Oral Q4H PRN    Or   • benztropine mesylate (Cogentin) 1 mg/mL injection 2 mg  2 mg Intramuscular Q4H PRN   • haloperidol (Haldol) tab 5 mg  5 mg Oral Q4H PRN    Or   • haloperidol lactate (Haldol) 5 MG/ML injection 5 mg  5 mg Intramuscular Q4H PRN   • LORazepam (Ativan) tab 2 mg  2 mg Oral Q4H PRN    Or   • LORazepam (Ativan) 2 mg/mL injection 2 mg  2 mg Intramuscular Q4H PRN   • magnesium hydroxide (Milk of Magnesia) 400 MG/5ML oral suspension 30 mL  30 mL Oral Nightly PRN   • alum-mag hydroxide-simethicone (Maalox) 200-200-20 MG/5ML oral suspension 30 mL  30 mL Oral Q4H PRN   • acetaminophen (Tylenol) tab 325 mg  325 mg Oral Q4H PRN    Or   • acetaminophen (Tylenol) tab 650 mg  650 mg Oral Q4H PRN   • propranolol (Inderal) tab 10 mg  10 mg Oral TID   • topiramate (TopaMAX) tab 200 mg  200 mg Oral Daily   • atorvastatin (Lipitor) tab 40 mg  40 mg Oral Nightly   • lisinopril (Prinivil; Zestril) tab 20 mg  20 mg Oral Daily   • metFORMIN (Glucophage) tab 500 mg  500 mg Oral Daily with breakfast       Outpatient Medications:   (Not in a hospital admission)      Allergies: Latex and Penicillins      Anesthesia Evaluation    Patient summary reviewed.    Anesthetic Complications           GI/Hepatic/Renal                                 Cardiovascular                  (+) hypertension                                     Endo/Other      (+) diabetes                            Pulmonary                           Neuro/Psych                     (+) psychiatric history             Past Surgical History:   Procedure Laterality Date   • Colonoscopy       Social History     Socioeconomic History   • Marital status: Single   Tobacco Use   • Smoking status: Never   • Smokeless tobacco: Never   Vaping Use   • Vaping status: Never Used   Substance and Sexual Activity   • Alcohol use: Never   • Drug use: Never     History   Drug Use Unknown     Available pre-op labs reviewed.  Lab Results   Component Value Date    WBC 8.7 07/03/2024     RBC 4.39 07/03/2024    HGB 13.5 07/03/2024    HCT 39.3 07/03/2024    MCV 89.5 07/03/2024    MCH 30.8 07/03/2024    MCHC 34.4 07/03/2024    RDW 13.2 07/03/2024    .0 (L) 07/03/2024     Lab Results   Component Value Date     06/27/2024    K 3.6 06/27/2024     06/27/2024    CO2 28.0 06/27/2024    BUN 17 06/27/2024    CREATSERUM 1.05 06/27/2024     (H) 06/27/2024    CA 9.0 06/27/2024            Airway      Mallampati: II  Mouth opening: 3 FB  TM distance: 4 - 6 cm  Neck ROM: full Cardiovascular    Cardiovascular exam normal.         Dental  Comment: Normal wear/minor imperfections and discoloration noted. No grossly weakened or damaged teeth on exam.           Pulmonary    Pulmonary exam normal.                 Other findings        ASA: 2   Plan: general  NPO status verified and patient meets guidelines.        Comment: GA with mask vent              Present on Admission:  **None**

## 2024-07-08 ENCOUNTER — ANESTHESIA EVENT (OUTPATIENT)
Dept: POSTOP/PACU | Facility: HOSPITAL | Age: 59
End: 2024-07-08
Payer: MEDICARE

## 2024-07-08 ENCOUNTER — ANESTHESIA (OUTPATIENT)
Dept: POSTOP/PACU | Facility: HOSPITAL | Age: 59
End: 2024-07-08
Payer: MEDICARE

## 2024-07-08 ENCOUNTER — HOSPITAL ENCOUNTER (OUTPATIENT)
Dept: POSTOP/PACU | Facility: HOSPITAL | Age: 59
Discharge: HOME | End: 2024-07-08
Attending: Other
Payer: MEDICARE

## 2024-07-08 VITALS
RESPIRATION RATE: 16 BRPM | HEART RATE: 89 BPM | OXYGEN SATURATION: 99 % | TEMPERATURE: 98 F | BODY MASS INDEX: 25 KG/M2 | DIASTOLIC BLOOD PRESSURE: 99 MMHG | SYSTOLIC BLOOD PRESSURE: 151 MMHG | HEIGHT: 74 IN

## 2024-07-08 DIAGNOSIS — F20.9 SCHIZOPHRENIA, UNSPECIFIED TYPE (HCC): ICD-10-CM

## 2024-07-08 RX ORDER — CAFFEINE AND SODIUM BENZOATE 125 MG/ML
INJECTION, SOLUTION INTRAMUSCULAR; INTRAVENOUS
Status: COMPLETED
Start: 2024-07-08 | End: 2024-07-08

## 2024-07-08 RX ORDER — NICOTINE POLACRILEX 4 MG
15 LOZENGE BUCCAL
Status: DISCONTINUED | OUTPATIENT
Start: 2024-07-08 | End: 2024-07-08 | Stop reason: HOSPADM

## 2024-07-08 RX ORDER — SODIUM CHLORIDE, SODIUM LACTATE, POTASSIUM CHLORIDE, CALCIUM CHLORIDE 600; 310; 30; 20 MG/100ML; MG/100ML; MG/100ML; MG/100ML
INJECTION, SOLUTION INTRAVENOUS CONTINUOUS
Status: DISCONTINUED | OUTPATIENT
Start: 2024-07-08 | End: 2024-07-08 | Stop reason: HOSPADM

## 2024-07-08 RX ORDER — KETOROLAC TROMETHAMINE 30 MG/ML
15 INJECTION, SOLUTION INTRAMUSCULAR; INTRAVENOUS ONCE
Status: COMPLETED | OUTPATIENT
Start: 2024-07-08 | End: 2024-07-08

## 2024-07-08 RX ORDER — HYDROMORPHONE HYDROCHLORIDE 1 MG/ML
0.6 INJECTION, SOLUTION INTRAMUSCULAR; INTRAVENOUS; SUBCUTANEOUS EVERY 5 MIN PRN
Status: DISCONTINUED | OUTPATIENT
Start: 2024-07-08 | End: 2024-07-08 | Stop reason: HOSPADM

## 2024-07-08 RX ORDER — HYDROMORPHONE HYDROCHLORIDE 1 MG/ML
0.4 INJECTION, SOLUTION INTRAMUSCULAR; INTRAVENOUS; SUBCUTANEOUS EVERY 5 MIN PRN
Status: DISCONTINUED | OUTPATIENT
Start: 2024-07-08 | End: 2024-07-08 | Stop reason: HOSPADM

## 2024-07-08 RX ORDER — MIDAZOLAM HYDROCHLORIDE 1 MG/ML
1 INJECTION INTRAMUSCULAR; INTRAVENOUS EVERY 5 MIN PRN
Status: DISCONTINUED | OUTPATIENT
Start: 2024-07-08 | End: 2024-07-08 | Stop reason: HOSPADM

## 2024-07-08 RX ORDER — HYDROMORPHONE HYDROCHLORIDE 1 MG/ML
0.2 INJECTION, SOLUTION INTRAMUSCULAR; INTRAVENOUS; SUBCUTANEOUS EVERY 5 MIN PRN
Status: DISCONTINUED | OUTPATIENT
Start: 2024-07-08 | End: 2024-07-08 | Stop reason: HOSPADM

## 2024-07-08 RX ORDER — KETOROLAC TROMETHAMINE 30 MG/ML
INJECTION, SOLUTION INTRAMUSCULAR; INTRAVENOUS
Status: COMPLETED
Start: 2024-07-08 | End: 2024-07-08

## 2024-07-08 RX ORDER — KETAMINE HYDROCHLORIDE 50 MG/ML
INJECTION, SOLUTION INTRAMUSCULAR; INTRAVENOUS AS NEEDED
Status: DISCONTINUED | OUTPATIENT
Start: 2024-07-08 | End: 2024-07-08 | Stop reason: SURG

## 2024-07-08 RX ORDER — SODIUM CHLORIDE, SODIUM LACTATE, POTASSIUM CHLORIDE, CALCIUM CHLORIDE 600; 310; 30; 20 MG/100ML; MG/100ML; MG/100ML; MG/100ML
INJECTION, SOLUTION INTRAVENOUS CONTINUOUS
Status: DISCONTINUED | OUTPATIENT
Start: 2024-07-08 | End: 2024-07-10

## 2024-07-08 RX ORDER — ONDANSETRON 2 MG/ML
INJECTION INTRAMUSCULAR; INTRAVENOUS
Status: COMPLETED
Start: 2024-07-08 | End: 2024-07-08

## 2024-07-08 RX ORDER — CAFFEINE AND SODIUM BENZOATE 125 MG/ML
500 INJECTION, SOLUTION INTRAMUSCULAR; INTRAVENOUS ONCE
Status: COMPLETED | OUTPATIENT
Start: 2024-07-08 | End: 2024-07-08

## 2024-07-08 RX ORDER — GLYCOPYRROLATE 0.2 MG/ML
0.1 INJECTION, SOLUTION INTRAMUSCULAR; INTRAVENOUS ONCE
Status: COMPLETED | OUTPATIENT
Start: 2024-07-08 | End: 2024-07-08

## 2024-07-08 RX ORDER — NICOTINE POLACRILEX 4 MG
30 LOZENGE BUCCAL
Status: DISCONTINUED | OUTPATIENT
Start: 2024-07-08 | End: 2024-07-08 | Stop reason: HOSPADM

## 2024-07-08 RX ORDER — ETOMIDATE 2 MG/ML
INJECTION INTRAVENOUS AS NEEDED
Status: DISCONTINUED | OUTPATIENT
Start: 2024-07-08 | End: 2024-07-08 | Stop reason: SURG

## 2024-07-08 RX ORDER — DEXTROSE MONOHYDRATE 25 G/50ML
50 INJECTION, SOLUTION INTRAVENOUS
Status: DISCONTINUED | OUTPATIENT
Start: 2024-07-08 | End: 2024-07-08 | Stop reason: HOSPADM

## 2024-07-08 RX ORDER — GLYCOPYRROLATE 0.2 MG/ML
INJECTION, SOLUTION INTRAMUSCULAR; INTRAVENOUS
Status: COMPLETED
Start: 2024-07-08 | End: 2024-07-08

## 2024-07-08 RX ORDER — HYDRALAZINE HYDROCHLORIDE 20 MG/ML
20 INJECTION INTRAMUSCULAR; INTRAVENOUS ONCE
Status: COMPLETED | OUTPATIENT
Start: 2024-07-08 | End: 2024-07-08

## 2024-07-08 RX ORDER — ONDANSETRON 2 MG/ML
4 INJECTION INTRAMUSCULAR; INTRAVENOUS ONCE
Status: COMPLETED | OUTPATIENT
Start: 2024-07-08 | End: 2024-07-08

## 2024-07-08 RX ORDER — NALOXONE HYDROCHLORIDE 0.4 MG/ML
80 INJECTION, SOLUTION INTRAMUSCULAR; INTRAVENOUS; SUBCUTANEOUS AS NEEDED
Status: DISCONTINUED | OUTPATIENT
Start: 2024-07-08 | End: 2024-07-08 | Stop reason: HOSPADM

## 2024-07-08 RX ORDER — HYDRALAZINE HYDROCHLORIDE 20 MG/ML
INJECTION INTRAMUSCULAR; INTRAVENOUS
Status: COMPLETED
Start: 2024-07-08 | End: 2024-07-08

## 2024-07-08 RX ADMIN — CAFFEINE AND SODIUM BENZOATE 500 MG: 125 INJECTION, SOLUTION INTRAMUSCULAR; INTRAVENOUS at 06:35:00

## 2024-07-08 RX ADMIN — GLYCOPYRROLATE 0.1 MG: 0.2 INJECTION, SOLUTION INTRAMUSCULAR; INTRAVENOUS at 06:09:00

## 2024-07-08 RX ADMIN — ONDANSETRON 4 MG: 2 INJECTION INTRAMUSCULAR; INTRAVENOUS at 06:17:00

## 2024-07-08 RX ADMIN — SODIUM CHLORIDE, SODIUM LACTATE, POTASSIUM CHLORIDE, CALCIUM CHLORIDE: 600; 310; 30; 20 INJECTION, SOLUTION INTRAVENOUS at 06:07:00

## 2024-07-08 RX ADMIN — KETOROLAC TROMETHAMINE 15 MG: 30 INJECTION, SOLUTION INTRAMUSCULAR; INTRAVENOUS at 06:12:00

## 2024-07-08 RX ADMIN — ETOMIDATE 16 MG: 2 INJECTION INTRAVENOUS at 07:14:00

## 2024-07-08 RX ADMIN — SODIUM CHLORIDE, SODIUM LACTATE, POTASSIUM CHLORIDE, CALCIUM CHLORIDE: 600; 310; 30; 20 INJECTION, SOLUTION INTRAVENOUS at 07:12:00

## 2024-07-08 RX ADMIN — KETAMINE HYDROCHLORIDE 25 MG: 50 INJECTION, SOLUTION INTRAMUSCULAR; INTRAVENOUS at 07:14:00

## 2024-07-08 RX ADMIN — HYDRALAZINE HYDROCHLORIDE 20 MG: 20 INJECTION INTRAMUSCULAR; INTRAVENOUS at 06:14:00

## 2024-07-08 NOTE — PROGRESS NOTES
[facility]  ECT Procedure Note      [facility]  ECT Procedure Note    Dylon Perla Patient Status:  Outpatient   Age/Gender 58 year old male   MRN [id number]    Location [facility] POST ANESTHESIA CARE UNIT Attending No att. providers found   Hosp Day # 0 PCP No primary care provider on file.     ECT Number: #5    Diagnosis: Schizoaffective Disorder Bipolar Type. F25.0    Type of ECT:  Bitemporal    Place of Service:  Inpatient    Settings:   1.  Energy Percentage: 90%    2.  Program:  Low 0.5    Pre-ECT Evaluation    Symptoms:      Prior to procedure, reviewed with treatment team correct patient, time of procedure and type of ECT.  Also reviewed with anesthesia pre-ECT medications    Patient continues to show improvement in his mood in that it is much more even and less irritable.  Patient is pleasant upon interview and appears calmer and more relaxed.  Per staff, patient remains delusional and this delusion has not lessened t with treatments thus far.  Pt continues to believe that he killed a baby and is going to custodial.  Patient is sleeping and eating fairly well.  He denies SI or HI.  Cognitive side effects are mild and tolerable.    The patient continues to retain capacity to consent for ECT.    Risk/Benefits:  Discussed with patient side effects of ECT including headache, teeth, jaw, cardiac, pulmonary, NPO, aspiration, allergic reactions, anesthetic reactions, musculoskeletal, neurologic, morbidity/mortality, potential lack of efficacy, unilateral/bilateral ECT, relapse/maintenance issues, cognitive risks including memory, concentration, cognition, and other risks.    Side Effects:  Mild memory complaint    Exam:  Mood:  More even  Affect: Congruent and slightly brighter and more relaxed  Memory:  intact immediate, recent, remote and as evidenced by ability to present consistent history  Concentration:   focused and attentive and as assessed by   ability to concentrate on our conversation  Suicidal ideation: no suicidal ideation    Patient Monitored:  B/P, EKG, EEG, Pulse Ox, Left Ankle Cuff    Pre-ECT Medications:  Robinul 0.1 mg IV, Toradol 15 mg IV, Zofran 4 mg IV, Hydralazine 20 mg IV 30 min prior to treatment, and Caffeine 500 mg IV     ECT Medications:  :  Anesthetic  Etomidate 16 mg IV followed by ketamine 25 mg IV and Succinylcholine 70 mg IV     Seizure Duration:  Motor: 0 seconds       EE seconds    Post-ECT Condition:  Treatment remarkable for no motor seizure and he may want to consider increased caffeine or increased settings    ECT Medications: Propofol 50 mg IV    Aida Drake    2024

## 2024-07-08 NOTE — ANESTHESIA PREPROCEDURE EVALUATION
PRE-OP EVALUATION    Patient Name: Dylon Perla    Admit Diagnosis: Psychosis, unspecified psychosis type (HCC) [F29]    Pre-op Diagnosis: * No surgery found *        Anesthesia Procedure: ECT(BEDSIDE)    * Surgery not found *    Pre-op vitals reviewed.  Temp: 98 °F (36.7 °C)  Pulse: 90  Resp: 16  BP: 142/95  SpO2: 97 %  Body mass index is 24.77 kg/m².    Current medications reviewed.  Hospital Medications:   lactated ringers infusion   Intravenous Continuous    [COMPLETED] glycopyrrolate (Robinul) 0.2 MG/ML injection 0.1 mg  0.1 mg Intravenous Once    [COMPLETED] ketorolac (Toradol) 30 MG/ML injection 15 mg  15 mg Intravenous Once    [COMPLETED] ondansetron (Zofran) 4 MG/2ML injection 4 mg  4 mg Intravenous Once    [COMPLETED] hydrALAzine (Apresoline) 20 mg/mL injection 20 mg  20 mg Intravenous Once    caffeine-sodium benzoate 125-125 mg/mL injection  500 mg Intravenous Once    [COMPLETED] acetaminophen (Tylenol Extra Strength) tab 1,000 mg  1,000 mg Oral Once    [COMPLETED] caffeine-sodium benzoate 125-125 MG/ML injection        traZODone (Desyrel) tab 100 mg  100 mg Oral Nightly    haloperidol (Haldol) tab 10 mg  10 mg Oral QAM    haloperidol (Haldol) tab 15 mg  15 mg Oral Nightly    cloZAPine (Clozaril) tab 100 mg  100 mg Oral Daily    cloZAPine (Clozaril) tab 600 mg  600 mg Oral Nightly    sertraline (Zoloft) tab 50 mg  50 mg Oral Daily    benztropine (Cogentin) tab 2 mg  2 mg Oral Q4H PRN    Or    benztropine mesylate (Cogentin) 1 mg/mL injection 2 mg  2 mg Intramuscular Q4H PRN    haloperidol (Haldol) tab 5 mg  5 mg Oral Q4H PRN    Or    haloperidol lactate (Haldol) 5 MG/ML injection 5 mg  5 mg Intramuscular Q4H PRN    LORazepam (Ativan) tab 2 mg  2 mg Oral Q4H PRN    Or    LORazepam (Ativan) 2 mg/mL injection 2 mg  2 mg Intramuscular Q4H PRN    magnesium hydroxide (Milk of Magnesia) 400 MG/5ML oral suspension 30 mL  30 mL Oral Nightly PRN    alum-mag hydroxide-simethicone (Maalox) 200-200-20 MG/5ML oral  suspension 30 mL  30 mL Oral Q4H PRN    acetaminophen (Tylenol) tab 325 mg  325 mg Oral Q4H PRN    Or    acetaminophen (Tylenol) tab 650 mg  650 mg Oral Q4H PRN    propranolol (Inderal) tab 10 mg  10 mg Oral TID    topiramate (TopaMAX) tab 200 mg  200 mg Oral Daily    atorvastatin (Lipitor) tab 40 mg  40 mg Oral Nightly    lisinopril (Prinivil; Zestril) tab 20 mg  20 mg Oral Daily    metFORMIN (Glucophage) tab 500 mg  500 mg Oral Daily with breakfast       Outpatient Medications:   (Not in a hospital admission)      Allergies: Latex and Penicillins      Anesthesia Evaluation    Patient summary reviewed.    Anesthetic Complications           GI/Hepatic/Renal                                 Cardiovascular                  (+) hypertension                                     Endo/Other      (+) diabetes                            Pulmonary                           Neuro/Psych                     (+) psychiatric history                 Past Surgical History:   Procedure Laterality Date    Colonoscopy       Social History     Socioeconomic History    Marital status: Single   Tobacco Use    Smoking status: Never    Smokeless tobacco: Never   Vaping Use    Vaping status: Never Used   Substance and Sexual Activity    Alcohol use: Never    Drug use: Never     History   Drug Use Unknown     Available pre-op labs reviewed.  Lab Results   Component Value Date    WBC 8.7 07/03/2024    RBC 4.39 07/03/2024    HGB 13.5 07/03/2024    HCT 39.3 07/03/2024    MCV 89.5 07/03/2024    MCH 30.8 07/03/2024    MCHC 34.4 07/03/2024    RDW 13.2 07/03/2024    .0 (L) 07/03/2024     Lab Results   Component Value Date     06/27/2024    K 3.6 06/27/2024     06/27/2024    CO2 28.0 06/27/2024    BUN 17 06/27/2024    CREATSERUM 1.05 06/27/2024     (H) 06/27/2024    CA 9.0 06/27/2024            Airway      Mallampati: II  Mouth opening: 3 FB  TM distance: 4 - 6 cm  Neck ROM: full Cardiovascular    Cardiovascular exam  normal.         Dental  Comment: Normal wear/minor imperfections and discoloration noted. No grossly weakened or damaged teeth on exam.           Pulmonary    Pulmonary exam normal.                 Other findings            ASA: 2   Plan: general  NPO status verified and patient meets guidelines.        Comment: GA with mask vent                Present on Admission:  **None**

## 2024-07-08 NOTE — ANESTHESIA POSTPROCEDURE EVALUATION
[facility]    Dylon Perla Patient Status:  Outpatient   Age/Gender 58 year old male MRN [id number]   Location [facility] POST ANESTHESIA CARE UNIT Attending Dameon Back MD   Hosp Day # 0 PCP No primary care provider on file.       Anesthesia Post-op Note        Procedure Summary       Date: 07/08/24 Room / Location: [facility] Post Anesthesia Care Unit    Anesthesia Start: 0712 Anesthesia Stop: 0726    Procedure: ECT(BEDSIDE) Diagnosis: Schizophrenia, unspecified type ([facility])    Scheduled Providers:  Anesthesiologist: Tiera Snider MD    Anesthesia Type: general ASA Status: 2            Anesthesia Type: general    Vitals Value Taken Time   /103 07/08/24 0919   Temp 97 07/08/24 0919   Pulse 95 07/08/24 0919   Resp 13 07/08/24 0919   SpO2 99 07/08/24 0919       Patient Location: PACU    Anesthesia Type: general    Airway Patency: patent    Postop Pain Control: adequate    Mental Status: preanesthetic baseline    Nausea/Vomiting: none    Cardiopulmonary/Hydration status: stable euvolemic    Complications: no apparent anesthesia related complications    Postop vital signs: stable    Dental Exam: Unchanged from Preop    Patient to be discharged from PACU when criteria met.

## 2024-07-10 ENCOUNTER — ANESTHESIA EVENT (OUTPATIENT)
Dept: POSTOP/PACU | Facility: HOSPITAL | Age: 59
End: 2024-07-10
Payer: MEDICARE

## 2024-07-10 ENCOUNTER — ANESTHESIA (OUTPATIENT)
Dept: POSTOP/PACU | Facility: HOSPITAL | Age: 59
End: 2024-07-10
Payer: MEDICARE

## 2024-07-10 RX ORDER — KETAMINE HYDROCHLORIDE 50 MG/ML
INJECTION, SOLUTION INTRAMUSCULAR; INTRAVENOUS AS NEEDED
Status: DISCONTINUED | OUTPATIENT
Start: 2024-07-10 | End: 2024-07-10 | Stop reason: SURG

## 2024-07-10 RX ORDER — ETOMIDATE 2 MG/ML
INJECTION INTRAVENOUS AS NEEDED
Status: DISCONTINUED | OUTPATIENT
Start: 2024-07-10 | End: 2024-07-10 | Stop reason: SURG

## 2024-07-10 RX ADMIN — ETOMIDATE 16 MG: 2 INJECTION INTRAVENOUS at 06:45:00

## 2024-07-10 RX ADMIN — KETAMINE HYDROCHLORIDE 25 MG: 50 INJECTION, SOLUTION INTRAMUSCULAR; INTRAVENOUS at 06:45:00

## 2024-07-10 NOTE — ANESTHESIA POSTPROCEDURE EVALUATION
[facility]    Dylon Perla Patient Status:  Outpatient   Age/Gender 58 year old male MRN [id number]   Location [facility] POST ANESTHESIA CARE UNIT Attending Dameon Back MD   Hosp Day # 0 PCP No primary care provider on file.       Anesthesia Post-op Note        Procedure Summary       Date: 07/10/24 Room / Location: [facility] Post Anesthesia Care Unit    Anesthesia Start: 0645 Anesthesia Stop: 0658    Procedure: ECT(BEDSIDE) Diagnosis: Schizophrenia, unspecified type ([facility])    Scheduled Providers:  Anesthesiologist: John Montiel MD    Anesthesia Type: general ASA Status: 2            Anesthesia Type: general    Vitals Value Taken Time   /98 07/10/24 0714   Temp  07/10/24 0716   Pulse 84 07/10/24 0716   Resp 11 07/10/24 0716   SpO2 100 % 07/10/24 0716   Vitals shown include unfiled device data.    Patient Location: PACU    Anesthesia Type: general    Airway Patency: patent    Postop Pain Control: adequate    Mental Status: mildly sedated but able to meaningfully participate in the post-anesthesia evaluation    Nausea/Vomiting: none    Cardiopulmonary/Hydration status: stable euvolemic    Complications: no apparent anesthesia related complications    Postop vital signs: stable    Dental Exam: Unchanged from Preop    Patient to be discharged from PACU when criteria met.

## 2024-07-10 NOTE — PROGRESS NOTES
[facility]  ECT History & Physical    Dylon Perla Patient Status:  Outpatient   Age/Gender 58 year old male MRN [id number]   Location [facility] POST ANESTHESIA CARE UNIT Attending Dameon Back MD   Hosp Day # 0 PCP No primary care provider on file.     Date of Service: 7/9/2024    Diagnosis:  Schizoaffective Disorder Bipolar Type. F25.0    Procedure:  Bitemporal    HPI: Significant improvement in mood but still delusional.  No physical complaints      Medical History:  Past Medical History:    Diabetes (HCC)    Diabetes mellitus (HCC)    Non-insulin dependent    Essential hypertension    High blood pressure    High cholesterol    Hyperlipidemia       Surgical History:  Past Surgical History:   Procedure Laterality Date    Colonoscopy         Family History:  Family History   Family history unknown: Yes       Social History:  Social History     Socioeconomic History    Marital status: Single     Spouse name: Not on file    Number of children: Not on file    Years of education: Not on file    Highest education level: Not on file   Occupational History    Not on file   Tobacco Use    Smoking status: Never    Smokeless tobacco: Never   Vaping Use    Vaping status: Never Used   Substance and Sexual Activity    Alcohol use: Never    Drug use: Never    Sexual activity: Not on file   Other Topics Concern    Not on file   Social History Narrative    Not on file     Social Determinants of Health     Financial Resource Strain: Low Risk  (6/19/2024)    Financial Resource Strain     Difficulty of Paying Living Expenses: Not on file     Med Affordability: No   Food Insecurity: No Food Insecurity (6/19/2024)    Food Insecurity     Food Insecurity: Never true   Transportation Needs: No Transportation Needs (6/19/2024)    Transportation Needs     Lack of Transportation: No     Car Seat: Not on file   Physical Activity: Not on file   Stress: Not on file   Social Connections: Not on file   Housing  Stability: Low Risk  (6/19/2024)    Housing Stability     Housing Instability: No     Housing Instability Emergency: Not on file     Crib or [place]in[place]: Not on file       ROS:  unremarkable    Physical Exam:   BP (!) 151/99   Pulse 89   Temp 98.1 °F (36.7 °C) (Temporal)   Resp 16   Ht 74\"   SpO2 99%   BMI 24.77 kg/m²     General Appearance:    Alert, cooperative, no distress, appears stated age   Head:    Normocephalic, without obvious abnormality, atraumatic   Eyes:    PERRL, conjunctiva/corneas clear, EOM's intact   Nose:   Nares normal, septum midline, mucosa normal, no drainage    or sinus tenderness   Throat:   Lips, mucosa, and tongue normal; teeth and gums normal   Neck:   Supple, symmetrical, trachea midline   Lungs:     Clear to auscultation bilaterally, respirations unlabored    Heart:    Regular rate and rhythm, S1 and S2 normal, no murmur, rub or gallop   Abdomen:     Soft, non-tender, bowel sounds active all four quadrants,     no masses, no organomegaly   Extremities:   Extremities normal, atraumatic, no cyanosis or edema   Pulses:   2+ and symmetric all extremities   Skin:   Skin color, texture, turgor normal, no rashes or lesions   Neurologic:   CNII-XII intact, normal strength, sensation and reflexes     throughout     Impressions & Plans:    Diagnosis:  Schizoaffective Disorder Bipolar Type. F25.0    Procedure:  Bitemporal    I have discussed the risks and benefits and alternatives with the patient/family.  They understand and agree to proceed with plan of care.    Aida Drake MD  7/9/2024

## 2024-07-10 NOTE — ANESTHESIA PREPROCEDURE EVALUATION
PRE-OP EVALUATION    Patient Name: Dylon Perla    Admit Diagnosis: Psychosis, unspecified psychosis type (HCC) [F29]    Pre-op Diagnosis: * No surgery found *        Anesthesia Procedure: ECT(BEDSIDE)    * Surgery not found *    Pre-op vitals reviewed.  Temp: 98 °F (36.7 °C)  Pulse: 83  Resp: 16  BP: 136/89  SpO2: 96 %  Body mass index is 24.65 kg/m².    Current medications reviewed.  Hospital Medications:   glucose (Dex4) 15 GM/59ML oral liquid 15 g  15 g Oral Q15 Min PRN    Or    glucose (Glutose) 40% oral gel 15 g  15 g Oral Q15 Min PRN    Or    glucose-vitamin C (Dex-4) chewable tab 4 tablet  4 tablet Oral Q15 Min PRN    Or    dextrose 50% injection 50 mL  50 mL Intravenous Q15 Min PRN    Or    glucose (Dex4) 15 GM/59ML oral liquid 30 g  30 g Oral Q15 Min PRN    Or    glucose (Glutose) 40% oral gel 30 g  30 g Oral Q15 Min PRN    Or    glucose-vitamin C (Dex-4) chewable tab 8 tablet  8 tablet Oral Q15 Min PRN    lactated ringers infusion   Intravenous Continuous    [COMPLETED] glycopyrrolate (Robinul) 0.2 MG/ML injection 0.1 mg  0.1 mg Intravenous Once    [COMPLETED] ketorolac (Toradol) 30 MG/ML injection 15 mg  15 mg Intravenous Once    [COMPLETED] ondansetron (Zofran) 4 MG/2ML injection 4 mg  4 mg Intravenous Once    [COMPLETED] caffeine-sodium benzoate 125-125 mg/mL injection  750 mg Intravenous Once    [COMPLETED] hydrALAzine (Apresoline) 20 mg/mL injection 20 mg  20 mg Intravenous Once    [COMPLETED] ondansetron (Zofran) 4 MG/2ML injection        [COMPLETED] ketorolac (Toradol) 30 MG/ML injection        [COMPLETED] glycopyrrolate (Robinul) 0.2 MG/ML injection        [COMPLETED] caffeine-sodium benzoate 125-125 MG/ML injection        [COMPLETED] hydrALAzine (Apresoline) 20 mg/mL injection        [COMPLETED] acetaminophen (Tylenol Extra Strength) tab 1,000 mg  1,000 mg Oral Once    traZODone (Desyrel) tab 100 mg  100 mg Oral Nightly    haloperidol (Haldol) tab 10 mg  10 mg Oral QAM    haloperidol (Haldol)  tab 15 mg  15 mg Oral Nightly    cloZAPine (Clozaril) tab 100 mg  100 mg Oral Daily    cloZAPine (Clozaril) tab 600 mg  600 mg Oral Nightly    sertraline (Zoloft) tab 50 mg  50 mg Oral Daily    benztropine (Cogentin) tab 2 mg  2 mg Oral Q4H PRN    Or    benztropine mesylate (Cogentin) 1 mg/mL injection 2 mg  2 mg Intramuscular Q4H PRN    haloperidol (Haldol) tab 5 mg  5 mg Oral Q4H PRN    Or    haloperidol lactate (Haldol) 5 MG/ML injection 5 mg  5 mg Intramuscular Q4H PRN    LORazepam (Ativan) tab 2 mg  2 mg Oral Q4H PRN    Or    LORazepam (Ativan) 2 mg/mL injection 2 mg  2 mg Intramuscular Q4H PRN    magnesium hydroxide (Milk of Magnesia) 400 MG/5ML oral suspension 30 mL  30 mL Oral Nightly PRN    alum-mag hydroxide-simethicone (Maalox) 200-200-20 MG/5ML oral suspension 30 mL  30 mL Oral Q4H PRN    acetaminophen (Tylenol) tab 325 mg  325 mg Oral Q4H PRN    Or    acetaminophen (Tylenol) tab 650 mg  650 mg Oral Q4H PRN    propranolol (Inderal) tab 10 mg  10 mg Oral TID    topiramate (TopaMAX) tab 200 mg  200 mg Oral Daily    atorvastatin (Lipitor) tab 40 mg  40 mg Oral Nightly    lisinopril (Prinivil; Zestril) tab 20 mg  20 mg Oral Daily    metFORMIN (Glucophage) tab 500 mg  500 mg Oral Daily with breakfast       Outpatient Medications:   (Not in a hospital admission)      Allergies: Latex and Penicillins      Anesthesia Evaluation    Patient summary reviewed.    Anesthetic Complications           GI/Hepatic/Renal                                 Cardiovascular                  (+) hypertension                                     Endo/Other      (+) diabetes                            Pulmonary                           Neuro/Psych                     (+) psychiatric history                 Past Surgical History:   Procedure Laterality Date    Colonoscopy       Social History     Socioeconomic History    Marital status: Single   Tobacco Use    Smoking status: Never    Smokeless tobacco: Never   Vaping Use    Vaping  status: Never Used   Substance and Sexual Activity    Alcohol use: Never    Drug use: Never     History   Drug Use Unknown     Available pre-op labs reviewed.  Lab Results   Component Value Date    WBC 8.8 07/08/2024    RBC 4.27 (L) 07/08/2024    HGB 13.0 07/08/2024    HCT 37.7 (L) 07/08/2024    MCV 88.3 07/08/2024    MCH 30.4 07/08/2024    MCHC 34.5 07/08/2024    RDW 13.2 07/08/2024    .0 (L) 07/08/2024     Lab Results   Component Value Date     06/27/2024    K 3.6 06/27/2024     06/27/2024    CO2 28.0 06/27/2024    BUN 17 06/27/2024    CREATSERUM 1.05 06/27/2024     (H) 06/27/2024    CA 9.0 06/27/2024            Airway      Mallampati: II  Mouth opening: 3 FB  TM distance: 4 - 6 cm  Neck ROM: full Cardiovascular    Cardiovascular exam normal.         Dental  Comment: Normal wear/minor imperfections and discoloration noted. No grossly weakened or damaged teeth on exam.           Pulmonary    Pulmonary exam normal.                 Other findings            ASA: 2   Plan: general  NPO status verified and patient meets guidelines.        Comment: GA with mask vent                Present on Admission:  **None**

## 2024-07-12 ENCOUNTER — ANESTHESIA EVENT (OUTPATIENT)
Dept: POSTOP/PACU | Facility: HOSPITAL | Age: 59
End: 2024-07-12
Payer: MEDICARE

## 2024-07-12 ENCOUNTER — ANESTHESIA (OUTPATIENT)
Dept: POSTOP/PACU | Facility: HOSPITAL | Age: 59
End: 2024-07-12
Payer: MEDICARE

## 2024-07-12 RX ORDER — KETAMINE HYDROCHLORIDE 50 MG/ML
INJECTION, SOLUTION INTRAMUSCULAR; INTRAVENOUS AS NEEDED
Status: DISCONTINUED | OUTPATIENT
Start: 2024-07-12 | End: 2024-07-12 | Stop reason: SURG

## 2024-07-12 RX ORDER — ETOMIDATE 2 MG/ML
INJECTION INTRAVENOUS AS NEEDED
Status: DISCONTINUED | OUTPATIENT
Start: 2024-07-12 | End: 2024-07-12 | Stop reason: SURG

## 2024-07-12 RX ADMIN — KETAMINE HYDROCHLORIDE 25 MG: 50 INJECTION, SOLUTION INTRAMUSCULAR; INTRAVENOUS at 07:55:00

## 2024-07-12 RX ADMIN — ETOMIDATE 16 MG: 2 INJECTION INTRAVENOUS at 07:55:00

## 2024-07-12 RX ADMIN — SODIUM CHLORIDE, SODIUM LACTATE, POTASSIUM CHLORIDE, CALCIUM CHLORIDE: 600; 310; 30; 20 INJECTION, SOLUTION INTRAVENOUS at 07:53:00

## 2024-07-12 NOTE — ANESTHESIA POSTPROCEDURE EVALUATION
[facility]    Dylon Perla Patient Status:  Outpatient   Age/Gender 58 year old male MRN [id number]   Location [facility] POST ANESTHESIA CARE UNIT Attending Dameon Back MD   Hosp Day # 0 PCP No primary care provider on file.       Anesthesia Post-op Note        Procedure Summary       Date: 07/12/24 Room / Location: [facility] Post Anesthesia Care Unit    Anesthesia Start: 0752 Anesthesia Stop:     Procedure: ECT(BEDSIDE) Diagnosis: Schizophrenia, unspecified type ([facility])    Scheduled Providers:  Anesthesiologist: Chet Sweeney MD    Anesthesia Type: general ASA Status: 2            Anesthesia Type: general    Vitals Value Taken Time   /91 07/12/24 0801   Temp 98.0 07/12/24 0801   Pulse 82 07/12/24 0801   Resp 16 07/12/24 0801   SpO2 99% 07/12/24 0801       Patient Location: PACU    Anesthesia Type: general    Airway Patency: patent    Postop Pain Control: adequate    Mental Status: mildly sedated but able to meaningfully participate in the post-anesthesia evaluation    Nausea/Vomiting: none    Cardiopulmonary/Hydration status: stable euvolemic    Complications: no apparent anesthesia related complications    Postop vital signs: stable    Dental Exam: Unchanged from Preop    Patient to be discharged from PACU when criteria met.

## 2024-07-12 NOTE — ADDENDUM NOTE
Addendum  created 07/12/24 1212 by Chet Sweeney MD    Intraprocedure Event edited, Intraprocedure Meds edited, Intraprocedure Staff edited

## 2024-07-12 NOTE — ANESTHESIA PREPROCEDURE EVALUATION
PRE-OP EVALUATION    Patient Name: Dylon Perla    Admit Diagnosis: Psychosis, unspecified psychosis type (HCC) [F29]    Pre-op Diagnosis: * No surgery found *        Anesthesia Procedure: ECT(BEDSIDE)    * Surgery not found *    Pre-op vitals reviewed.  Temp: 98 °F (36.7 °C)  Pulse: 83  Resp: 14  BP: 122/82  SpO2: 98 %  There is no height or weight on file to calculate BMI.    Current medications reviewed.  Hospital Medications:   glucose (Dex4) 15 GM/59ML oral liquid 15 g  15 g Oral Q15 Min PRN    Or    glucose (Glutose) 40% oral gel 15 g  15 g Oral Q15 Min PRN    Or    glucose-vitamin C (Dex-4) chewable tab 4 tablet  4 tablet Oral Q15 Min PRN    Or    dextrose 50% injection 50 mL  50 mL Intravenous Q15 Min PRN    Or    glucose (Dex4) 15 GM/59ML oral liquid 30 g  30 g Oral Q15 Min PRN    Or    glucose (Glutose) 40% oral gel 30 g  30 g Oral Q15 Min PRN    Or    glucose-vitamin C (Dex-4) chewable tab 8 tablet  8 tablet Oral Q15 Min PRN    lactated ringers infusion   Intravenous Continuous    [COMPLETED] glycopyrrolate (Robinul) 0.2 MG/ML injection 0.1 mg  0.1 mg Intravenous Once    [COMPLETED] ketorolac (Toradol) 30 MG/ML injection 15 mg  15 mg Intravenous Once    [COMPLETED] ondansetron (Zofran) 4 MG/2ML injection 4 mg  4 mg Intravenous Once    caffeine-sodium benzoate 125-125 mg/mL injection  750 mg Intravenous Once    [COMPLETED] hydrALAzine (Apresoline) 20 mg/mL injection 20 mg  20 mg Intravenous Once    [COMPLETED] ondansetron (Zofran) 4 MG/2ML injection        [COMPLETED] ketorolac (Toradol) 30 MG/ML injection        [COMPLETED] glycopyrrolate (Robinul) 0.2 MG/ML injection        [COMPLETED] hydrALAzine (Apresoline) 20 mg/mL injection        [COMPLETED] caffeine-sodium benzoate 125-125 MG/ML injection        [COMPLETED] acetaminophen (Tylenol Extra Strength) tab 1,000 mg  1,000 mg Oral Once    sertraline (Zoloft) tab 100 mg  100 mg Oral Daily    traZODone (Desyrel) tab 100 mg  100 mg Oral Nightly     haloperidol (Haldol) tab 10 mg  10 mg Oral QAM    haloperidol (Haldol) tab 15 mg  15 mg Oral Nightly    cloZAPine (Clozaril) tab 100 mg  100 mg Oral Daily    cloZAPine (Clozaril) tab 600 mg  600 mg Oral Nightly    benztropine (Cogentin) tab 2 mg  2 mg Oral Q4H PRN    Or    benztropine mesylate (Cogentin) 1 mg/mL injection 2 mg  2 mg Intramuscular Q4H PRN    haloperidol (Haldol) tab 5 mg  5 mg Oral Q4H PRN    Or    haloperidol lactate (Haldol) 5 MG/ML injection 5 mg  5 mg Intramuscular Q4H PRN    LORazepam (Ativan) tab 2 mg  2 mg Oral Q4H PRN    Or    LORazepam (Ativan) 2 mg/mL injection 2 mg  2 mg Intramuscular Q4H PRN    magnesium hydroxide (Milk of Magnesia) 400 MG/5ML oral suspension 30 mL  30 mL Oral Nightly PRN    alum-mag hydroxide-simethicone (Maalox) 200-200-20 MG/5ML oral suspension 30 mL  30 mL Oral Q4H PRN    acetaminophen (Tylenol) tab 325 mg  325 mg Oral Q4H PRN    Or    acetaminophen (Tylenol) tab 650 mg  650 mg Oral Q4H PRN    propranolol (Inderal) tab 10 mg  10 mg Oral TID    topiramate (TopaMAX) tab 200 mg  200 mg Oral Daily    atorvastatin (Lipitor) tab 40 mg  40 mg Oral Nightly    lisinopril (Prinivil; Zestril) tab 20 mg  20 mg Oral Daily    metFORMIN (Glucophage) tab 500 mg  500 mg Oral Daily with breakfast       Outpatient Medications:   (Not in a hospital admission)      Allergies: Latex and Penicillins      Anesthesia Evaluation    Patient summary reviewed.    Anesthetic Complications           GI/Hepatic/Renal                                 Cardiovascular                  (+) hypertension                                     Endo/Other      (+) diabetes                            Pulmonary                           Neuro/Psych                     (+) psychiatric history                 Past Surgical History:   Procedure Laterality Date    Colonoscopy       Social History     Socioeconomic History    Marital status: Single   Tobacco Use    Smoking status: Never    Smokeless tobacco: Never    Vaping Use    Vaping status: Never Used   Substance and Sexual Activity    Alcohol use: Never    Drug use: Never     History   Drug Use Unknown     Available pre-op labs reviewed.  Lab Results   Component Value Date    WBC 8.8 07/08/2024    RBC 4.27 (L) 07/08/2024    HGB 13.0 07/08/2024    HCT 37.7 (L) 07/08/2024    MCV 88.3 07/08/2024    MCH 30.4 07/08/2024    MCHC 34.5 07/08/2024    RDW 13.2 07/08/2024    .0 (L) 07/08/2024     Lab Results   Component Value Date     06/27/2024    K 3.6 06/27/2024     06/27/2024    CO2 28.0 06/27/2024    BUN 17 06/27/2024    CREATSERUM 1.05 06/27/2024     (H) 06/27/2024    CA 9.0 06/27/2024            Airway      Mallampati: II  Mouth opening: 3 FB  TM distance: 4 - 6 cm  Neck ROM: full Cardiovascular    Cardiovascular exam normal.         Dental  Comment: Normal wear/minor imperfections and discoloration noted. No grossly weakened or damaged teeth on exam.           Pulmonary    Pulmonary exam normal.                 Other findings            ASA: 2   Plan: general  NPO status verified and patient meets guidelines.        Comment: GA with mask vent                Present on Admission:  **None**

## 2024-07-15 ENCOUNTER — ANESTHESIA (OUTPATIENT)
Dept: POSTOP/PACU | Facility: HOSPITAL | Age: 59
End: 2024-07-15
Payer: MEDICARE

## 2024-07-15 ENCOUNTER — HOSPITAL ENCOUNTER (OUTPATIENT)
Dept: POSTOP/PACU | Facility: HOSPITAL | Age: 59
Discharge: HOME | End: 2024-07-15
Attending: Other
Payer: MEDICARE

## 2024-07-15 ENCOUNTER — ANESTHESIA EVENT (OUTPATIENT)
Dept: POSTOP/PACU | Facility: HOSPITAL | Age: 59
End: 2024-07-15
Payer: MEDICARE

## 2024-07-15 VITALS
OXYGEN SATURATION: 100 % | BODY MASS INDEX: 24.62 KG/M2 | HEART RATE: 84 BPM | TEMPERATURE: 98 F | RESPIRATION RATE: 13 BRPM | DIASTOLIC BLOOD PRESSURE: 92 MMHG | HEIGHT: 74 IN | SYSTOLIC BLOOD PRESSURE: 148 MMHG | WEIGHT: 191.81 LBS

## 2024-07-15 DIAGNOSIS — F20.9 SCHIZOPHRENIA, UNSPECIFIED TYPE (HCC): ICD-10-CM

## 2024-07-15 RX ORDER — METOPROLOL TARTRATE 1 MG/ML
2.5 INJECTION, SOLUTION INTRAVENOUS ONCE
Status: DISCONTINUED | OUTPATIENT
Start: 2024-07-15 | End: 2024-07-15 | Stop reason: HOSPADM

## 2024-07-15 RX ORDER — NALOXONE HYDROCHLORIDE 0.4 MG/ML
80 INJECTION, SOLUTION INTRAMUSCULAR; INTRAVENOUS; SUBCUTANEOUS AS NEEDED
Status: DISCONTINUED | OUTPATIENT
Start: 2024-07-15 | End: 2024-07-15 | Stop reason: HOSPADM

## 2024-07-15 RX ORDER — ETOMIDATE 2 MG/ML
INJECTION INTRAVENOUS AS NEEDED
Status: DISCONTINUED | OUTPATIENT
Start: 2024-07-15 | End: 2024-07-15 | Stop reason: SURG

## 2024-07-15 RX ORDER — SODIUM CHLORIDE, SODIUM LACTATE, POTASSIUM CHLORIDE, CALCIUM CHLORIDE 600; 310; 30; 20 MG/100ML; MG/100ML; MG/100ML; MG/100ML
INJECTION, SOLUTION INTRAVENOUS CONTINUOUS
Status: DISCONTINUED | OUTPATIENT
Start: 2024-07-15 | End: 2024-07-17

## 2024-07-15 RX ORDER — HYDRALAZINE HYDROCHLORIDE 20 MG/ML
10 INJECTION INTRAMUSCULAR; INTRAVENOUS ONCE
Status: COMPLETED | OUTPATIENT
Start: 2024-07-15 | End: 2024-07-15

## 2024-07-15 RX ORDER — KETAMINE HYDROCHLORIDE 50 MG/ML
INJECTION, SOLUTION INTRAMUSCULAR; INTRAVENOUS AS NEEDED
Status: DISCONTINUED | OUTPATIENT
Start: 2024-07-15 | End: 2024-07-15 | Stop reason: SURG

## 2024-07-15 RX ORDER — ONDANSETRON 2 MG/ML
INJECTION INTRAMUSCULAR; INTRAVENOUS
Status: COMPLETED
Start: 2024-07-15 | End: 2024-07-15

## 2024-07-15 RX ORDER — HYDRALAZINE HYDROCHLORIDE 20 MG/ML
INJECTION INTRAMUSCULAR; INTRAVENOUS
Status: COMPLETED
Start: 2024-07-15 | End: 2024-07-15

## 2024-07-15 RX ORDER — NICOTINE POLACRILEX 4 MG
15 LOZENGE BUCCAL
Status: DISCONTINUED | OUTPATIENT
Start: 2024-07-15 | End: 2024-07-17

## 2024-07-15 RX ORDER — HYDROMORPHONE HYDROCHLORIDE 1 MG/ML
0.4 INJECTION, SOLUTION INTRAMUSCULAR; INTRAVENOUS; SUBCUTANEOUS EVERY 5 MIN PRN
Status: DISCONTINUED | OUTPATIENT
Start: 2024-07-15 | End: 2024-07-15 | Stop reason: HOSPADM

## 2024-07-15 RX ORDER — DEXTROSE MONOHYDRATE 25 G/50ML
50 INJECTION, SOLUTION INTRAVENOUS
Status: DISCONTINUED | OUTPATIENT
Start: 2024-07-15 | End: 2024-07-17

## 2024-07-15 RX ORDER — KETAMINE HYDROCHLORIDE 50 MG/ML
INJECTION, SOLUTION INTRAMUSCULAR; INTRAVENOUS
Status: COMPLETED
Start: 2024-07-15 | End: 2024-07-15

## 2024-07-15 RX ORDER — CAFFEINE AND SODIUM BENZOATE 125 MG/ML
INJECTION, SOLUTION INTRAMUSCULAR; INTRAVENOUS
Status: COMPLETED
Start: 2024-07-15 | End: 2024-07-15

## 2024-07-15 RX ORDER — CAFFEINE AND SODIUM BENZOATE 125 MG/ML
750 INJECTION, SOLUTION INTRAMUSCULAR; INTRAVENOUS ONCE
Status: COMPLETED | OUTPATIENT
Start: 2024-07-15 | End: 2024-07-15

## 2024-07-15 RX ORDER — GLYCOPYRROLATE 0.2 MG/ML
INJECTION, SOLUTION INTRAMUSCULAR; INTRAVENOUS
Status: COMPLETED
Start: 2024-07-15 | End: 2024-07-15

## 2024-07-15 RX ORDER — ONDANSETRON 2 MG/ML
4 INJECTION INTRAMUSCULAR; INTRAVENOUS ONCE
Status: COMPLETED | OUTPATIENT
Start: 2024-07-15 | End: 2024-07-15

## 2024-07-15 RX ORDER — HYDROMORPHONE HYDROCHLORIDE 1 MG/ML
0.6 INJECTION, SOLUTION INTRAMUSCULAR; INTRAVENOUS; SUBCUTANEOUS EVERY 5 MIN PRN
Status: DISCONTINUED | OUTPATIENT
Start: 2024-07-15 | End: 2024-07-15 | Stop reason: HOSPADM

## 2024-07-15 RX ORDER — KETOROLAC TROMETHAMINE 30 MG/ML
15 INJECTION, SOLUTION INTRAMUSCULAR; INTRAVENOUS ONCE
Status: COMPLETED | OUTPATIENT
Start: 2024-07-15 | End: 2024-07-15

## 2024-07-15 RX ORDER — GLYCOPYRROLATE 0.2 MG/ML
0.1 INJECTION, SOLUTION INTRAMUSCULAR; INTRAVENOUS ONCE
Status: COMPLETED | OUTPATIENT
Start: 2024-07-15 | End: 2024-07-15

## 2024-07-15 RX ORDER — NICOTINE POLACRILEX 4 MG
30 LOZENGE BUCCAL
Status: DISCONTINUED | OUTPATIENT
Start: 2024-07-15 | End: 2024-07-17

## 2024-07-15 RX ORDER — DIPHENHYDRAMINE HYDROCHLORIDE 50 MG/ML
12.5 INJECTION INTRAMUSCULAR; INTRAVENOUS AS NEEDED
Status: DISCONTINUED | OUTPATIENT
Start: 2024-07-15 | End: 2024-07-15 | Stop reason: HOSPADM

## 2024-07-15 RX ORDER — SODIUM CHLORIDE, SODIUM LACTATE, POTASSIUM CHLORIDE, CALCIUM CHLORIDE 600; 310; 30; 20 MG/100ML; MG/100ML; MG/100ML; MG/100ML
INJECTION, SOLUTION INTRAVENOUS CONTINUOUS
Status: DISCONTINUED | OUTPATIENT
Start: 2024-07-15 | End: 2024-07-15 | Stop reason: HOSPADM

## 2024-07-15 RX ORDER — KETOROLAC TROMETHAMINE 30 MG/ML
INJECTION, SOLUTION INTRAMUSCULAR; INTRAVENOUS
Status: COMPLETED
Start: 2024-07-15 | End: 2024-07-15

## 2024-07-15 RX ORDER — HYDROMORPHONE HYDROCHLORIDE 1 MG/ML
0.2 INJECTION, SOLUTION INTRAMUSCULAR; INTRAVENOUS; SUBCUTANEOUS EVERY 5 MIN PRN
Status: DISCONTINUED | OUTPATIENT
Start: 2024-07-15 | End: 2024-07-15 | Stop reason: HOSPADM

## 2024-07-15 RX ORDER — MIDAZOLAM HYDROCHLORIDE 1 MG/ML
1 INJECTION INTRAMUSCULAR; INTRAVENOUS EVERY 5 MIN PRN
Status: DISCONTINUED | OUTPATIENT
Start: 2024-07-15 | End: 2024-07-15 | Stop reason: HOSPADM

## 2024-07-15 RX ADMIN — HYDRALAZINE HYDROCHLORIDE 10 MG: 20 INJECTION INTRAMUSCULAR; INTRAVENOUS at 06:20:00

## 2024-07-15 RX ADMIN — ONDANSETRON 4 MG: 2 INJECTION INTRAMUSCULAR; INTRAVENOUS at 06:24:00

## 2024-07-15 RX ADMIN — SODIUM CHLORIDE, SODIUM LACTATE, POTASSIUM CHLORIDE, CALCIUM CHLORIDE: 600; 310; 30; 20 INJECTION, SOLUTION INTRAVENOUS at 06:14:00

## 2024-07-15 RX ADMIN — SODIUM CHLORIDE, SODIUM LACTATE, POTASSIUM CHLORIDE, CALCIUM CHLORIDE: 600; 310; 30; 20 INJECTION, SOLUTION INTRAVENOUS at 07:20:00

## 2024-07-15 RX ADMIN — ETOMIDATE 16 MG: 2 INJECTION INTRAVENOUS at 07:05:00

## 2024-07-15 RX ADMIN — KETAMINE HYDROCHLORIDE 25 MG: 50 INJECTION, SOLUTION INTRAMUSCULAR; INTRAVENOUS at 07:05:00

## 2024-07-15 RX ADMIN — CAFFEINE AND SODIUM BENZOATE 750 MG: 125 INJECTION, SOLUTION INTRAMUSCULAR; INTRAVENOUS at 06:32:00

## 2024-07-15 RX ADMIN — GLYCOPYRROLATE 0.1 MG: 0.2 INJECTION, SOLUTION INTRAMUSCULAR; INTRAVENOUS at 06:15:00

## 2024-07-15 RX ADMIN — KETOROLAC TROMETHAMINE 15 MG: 30 INJECTION, SOLUTION INTRAMUSCULAR; INTRAVENOUS at 06:17:00

## 2024-07-15 NOTE — ANESTHESIA PREPROCEDURE EVALUATION
PRE-OP EVALUATION    Patient Name: Dylon Perla    Admit Diagnosis: Psychosis, unspecified psychosis type ([facility]) [F29]    Pre-op Diagnosis: * No surgery found *        Anesthesia Procedure: ECT(BEDSIDE)    * Surgery not found *    Pre-op vitals reviewed.  Temp: 97.7 °F (36.5 °C)  Pulse: 92  Resp: 12  BP: 117/80  SpO2: 98 %  Body mass index is 24.63 kg/m².    Current medications reviewed.  Hospital Medications:   glucose (Dex4) 15 GM/59ML oral liquid 15 g  15 g Oral Q15 Min PRN    Or    glucose (Glutose) 40% oral gel 15 g  15 g Oral Q15 Min PRN    Or    glucose-vitamin C (Dex-4) chewable tab 4 tablet  4 tablet Oral Q15 Min PRN    Or    dextrose 50% injection 50 mL  50 mL Intravenous Q15 Min PRN    Or    glucose (Dex4) 15 GM/59ML oral liquid 30 g  30 g Oral Q15 Min PRN    Or    glucose (Glutose) 40% oral gel 30 g  30 g Oral Q15 Min PRN    Or    glucose-vitamin C (Dex-4) chewable tab 8 tablet  8 tablet Oral Q15 Min PRN    lactated ringers infusion   Intravenous Continuous    [COMPLETED] glycopyrrolate (Robinul) 0.2 MG/ML injection 0.1 mg  0.1 mg Intravenous Once    [COMPLETED] ketorolac (Toradol) 30 MG/ML injection 15 mg  15 mg Intravenous Once    ondansetron (Zofran) 4 MG/2ML injection 4 mg  4 mg Intravenous Once    caffeine-sodium benzoate 125-125 mg/mL injection  750 mg Intravenous Once    [COMPLETED] hydrALAzine (Apresoline) 20 mg/mL injection 10 mg  10 mg Intravenous Once    [COMPLETED] acetaminophen (Tylenol Extra Strength) tab 1,000 mg  1,000 mg Oral Once    [COMPLETED] caffeine-sodium benzoate 125-125 MG/ML injection        [COMPLETED] ondansetron (Zofran) 4 MG/2ML injection        [COMPLETED] ketamine (Ketalar) 50 MG/ML injection        sertraline (Zoloft) tab 100 mg  100 mg Oral Daily    traZODone (Desyrel) tab 100 mg  100 mg Oral Nightly    haloperidol (Haldol) tab 10 mg  10 mg Oral QAM    haloperidol (Haldol) tab 15 mg  15 mg Oral Nightly    cloZAPine (Clozaril) tab 100 mg  100 mg Oral Daily    cloZAPine  (Clozaril) tab 600 mg  600 mg Oral Nightly    benztropine (Cogentin) tab 2 mg  2 mg Oral Q4H PRN    Or    benztropine mesylate (Cogentin) 1 mg/mL injection 2 mg  2 mg Intramuscular Q4H PRN    haloperidol (Haldol) tab 5 mg  5 mg Oral Q4H PRN    Or    haloperidol lactate (Haldol) 5 MG/ML injection 5 mg  5 mg Intramuscular Q4H PRN    LORazepam (Ativan) tab 2 mg  2 mg Oral Q4H PRN    Or    LORazepam (Ativan) 2 mg/mL injection 2 mg  2 mg Intramuscular Q4H PRN    magnesium hydroxide (Milk of Magnesia) 400 MG/5ML oral suspension 30 mL  30 mL Oral Nightly PRN    alum-mag hydroxide-simethicone (Maalox) 200-200-20 MG/5ML oral suspension 30 mL  30 mL Oral Q4H PRN    acetaminophen (Tylenol) tab 325 mg  325 mg Oral Q4H PRN    Or    acetaminophen (Tylenol) tab 650 mg  650 mg Oral Q4H PRN    propranolol (Inderal) tab 10 mg  10 mg Oral TID    topiramate (TopaMAX) tab 200 mg  200 mg Oral Daily    atorvastatin (Lipitor) tab 40 mg  40 mg Oral Nightly    lisinopril (Prinivil; Zestril) tab 20 mg  20 mg Oral Daily    metFORMIN (Glucophage) tab 500 mg  500 mg Oral Daily with breakfast       Outpatient Medications:   (Not in a hospital admission)      Allergies: Latex and Penicillins      Anesthesia Evaluation    Patient summary reviewed.    Anesthetic Complications  (-) history of anesthetic complications         GI/Hepatic/Renal                                 Cardiovascular                  (+) hypertension                                     Endo/Other      (+) diabetes                            Pulmonary                           Neuro/Psych                     (+) psychiatric history                 Past Surgical History:   Procedure Laterality Date    Colonoscopy       Social History     Socioeconomic History    Marital status: Single   Tobacco Use    Smoking status: Never    Smokeless tobacco: Never   Vaping Use    Vaping status: Never Used   Substance and Sexual Activity    Alcohol use: Never    Drug use: Never     History   Drug  Use Unknown     Available pre-op labs reviewed.  Lab Results   Component Value Date    WBC 7.6 07/14/2024    RBC 4.03 (L) 07/14/2024    HGB 12.4 (L) 07/14/2024    HCT 37.0 (L) 07/14/2024    MCV 91.8 07/14/2024    MCH 30.8 07/14/2024    MCHC 33.5 07/14/2024    RDW 13.7 07/14/2024    .0 (L) 07/14/2024     Lab Results   Component Value Date     06/27/2024    K 3.6 06/27/2024     06/27/2024    CO2 28.0 06/27/2024    BUN 17 06/27/2024    CREATSERUM 1.05 06/27/2024     (H) 06/27/2024    CA 9.0 06/27/2024            Airway      Mallampati: II  Mouth opening: 3 FB  TM distance: 4 - 6 cm  Neck ROM: full Cardiovascular    Cardiovascular exam normal.         Dental  Comment: Normal wear/minor imperfections and discoloration noted. No grossly weakened or damaged teeth on exam.           Pulmonary    Pulmonary exam normal.                 Other findings            ASA: 2   Plan: general  NPO status verified and patient meets guidelines.        Comment: GA with mask vent  Plan/risks discussed with: patient              Present on Admission:  **None**

## 2024-07-15 NOTE — ANESTHESIA POSTPROCEDURE EVALUATION
[facility]    Dylon Perla Patient Status:  Outpatient   Age/Gender 58 year old male MRN [id number]   Location [facility] POST ANESTHESIA CARE UNIT Attending Dameon Back MD   Hosp Day # 0 PCP No primary care provider on file.       Anesthesia Post-op Note        Procedure Summary       Date: 07/15/24 Room / Location: [facility] Post Anesthesia Care Unit    Anesthesia Start: 0704 Anesthesia Stop: 0719    Procedure: ECT(BEDSIDE) Diagnosis: Schizophrenia, unspecified type ([facility])    Scheduled Providers:  Anesthesiologist: Panfilo Oconnor MD    Anesthesia Type: general ASA Status: 2            Anesthesia Type: general    Vitals Value Taken Time   /72 07/15/24 0719   Temp 97 07/15/24 0719   Pulse 85 07/15/24 0719   Resp 19 07/15/24 0719   SpO2 99 07/15/24 0719       Patient Location: PACU    Anesthesia Type: general    Airway Patency: patent    Postop Pain Control: adequate    Mental Status: mildly sedated but able to meaningfully participate in the post-anesthesia evaluation    Nausea/Vomiting: none    Cardiopulmonary/Hydration status: stable euvolemic    Complications: no apparent anesthesia related complications    Postop vital signs: stable    Dental Exam: Unchanged from Preop    Patient to be discharged from PACU when criteria met.

## 2024-07-15 NOTE — PROGRESS NOTES
[facility]  ECT Procedure Note    Dylon Perla Patient Status:  Outpatient   Age/Gender 58 year old male   MRN [id number]    Location [facility] POST ANESTHESIA CARE UNIT Attending No att. providers found   Hosp Day # 0 PCP No primary care provider on file.     ECT Number: #8    Diagnosis: Schizoaffective Disorder Bipolar Type. F25.0    Type of ECT:  Bitemporal    Place of Service:  Inpatient    Settings:   1.  Energy Percentage: 90%    2.  Program:  Low 0.5    Pre-ECT Evaluation    Symptoms:      Prior to procedure, reviewed with treatment team correct patient, time of procedure and type of ECT.  Also reviewed with anesthesia pre-ECT medications    Patient continues to improve in all areas.  Patient's mood is even.  He is no longer irritable.  He denies being depressed.  Patient is better able to concentrate on the conversation.  He denies SI or HI.  No longer delusional, believing that he killed a baby.  Patient denies any cognitive side effects from ECT and feels his concentration and focus have improved.  He is sleeping and eating adequately.    The patient continues to retain capacity to consent for ECT.    Risk/Benefits:  Discussed with patient side effects of ECT including headache, teeth, jaw, cardiac, pulmonary, NPO, aspiration, allergic reactions, anesthetic reactions, musculoskeletal, neurologic, morbidity/mortality, potential lack of efficacy, unilateral/bilateral ECT, relapse/maintenance issues, cognitive risks including memory, concentration, cognition, and other risks.    Side Effects:  Patient notes no cognitive/physical complaints    Exam:  Mood:  More even, less irritable and less depressed  Affect: Congruent and brighter and more relaxed  Memory:  intact immediate, recent, remote and as evidenced by ability to present consistent history  Concentration:   focused and attentive and as assessed by  ability to concentrate on our conversation  Suicidal ideation: no  suicidal ideation    Patient Monitored:  B/P, EKG, EEG, Pulse Ox, Left Ankle Cuff    Pre-ECT Medications:   Robinul 0.1 mg IV, Toradol 15 mg IV, Zofran 4 mg IV, Hydralazine 20 mg IV 30 min prior to treatment, and caffeine 750 mg IV     ECT Medications:   Anesthetic  Etomidate 16 mg IV followed by ketamine 25 mg IV and Succinylcholine 70 mg IV       Seizure Duration:  Motor: 56 seconds       EE seconds    Post-ECT Condition:  Treatment unremarkable    ECT Medications:  Propofol 50 mg IV    Aida Drake    7/15/2024

## 2024-07-16 NOTE — PROGRESS NOTES
[facility]  ECT History & Physical    Dylon Perla Patient Status:  Outpatient   Age/Gender 58 year old male MRN [id number]   Location [facility] POST ANESTHESIA CARE UNIT Attending Dameon Back MD   Hosp Day # 0 PCP No primary care provider on file.     Date of Service: 7/16/2024    Diagnosis:  Schizoaffective Disorder Bipolar Type. F25.0    Procedure:  Bitemporal    HPI: Improving in all areas.  No physical complaints      Medical History:  Past Medical History:    Diabetes (HCC)    Diabetes mellitus (HCC)    Non-insulin dependent    Essential hypertension    High blood pressure    High cholesterol    Hyperlipidemia       Surgical History:  Past Surgical History:   Procedure Laterality Date    Colonoscopy         Family History:  Family History   Family history unknown: Yes       Social History:  Social History     Socioeconomic History    Marital status: Single     Spouse name: Not on file    Number of children: Not on file    Years of education: Not on file    Highest education level: Not on file   Occupational History    Not on file   Tobacco Use    Smoking status: Never    Smokeless tobacco: Never   Vaping Use    Vaping status: Never Used   Substance and Sexual Activity    Alcohol use: Never    Drug use: Never    Sexual activity: Not on file   Other Topics Concern    Not on file   Social History Narrative    Not on file     Social Determinants of Health     Financial Resource Strain: Low Risk  (6/19/2024)    Financial Resource Strain     Difficulty of Paying Living Expenses: Not on file     Med Affordability: No   Food Insecurity: No Food Insecurity (6/19/2024)    Food Insecurity     Food Insecurity: Never true   Transportation Needs: No Transportation Needs (6/19/2024)    Transportation Needs     Lack of Transportation: No     Car Seat: Not on file   Physical Activity: Not on file   Stress: Not on file   Social Connections: Not on file   Housing Stability: Low Risk  (6/19/2024)     Housing Stability     Housing Instability: No     Housing Instability Emergency: Not on file     Crib or Bassinette: Not on file       ROS:  unremarkable    Physical Exam:   BP (!) 148/92   Pulse 84   Temp 97.6 °F (36.4 °C) (Temporal)   Resp 13   Ht 74\"   Wt 87 kg (191 lb 12.8 oz)   SpO2 100%   BMI 24.63 kg/m²     General Appearance:    Alert, cooperative, no distress, appears stated age   Head:    Normocephalic, without obvious abnormality, atraumatic   Eyes:    PERRL, conjunctiva/corneas clear, EOM's intact   Nose:   Nares normal, septum midline, mucosa normal, no drainage    or sinus tenderness   Throat:   Lips, mucosa, and tongue normal; teeth and gums normal   Neck:   Supple, symmetrical, trachea midline   Lungs:     Clear to auscultation bilaterally, respirations unlabored    Heart:    Regular rate and rhythm, S1 and S2 normal, no murmur, rub or gallop   Abdomen:     Soft, non-tender, bowel sounds active all four quadrants,     no masses, no organomegaly   Extremities:   Extremities normal, atraumatic, no cyanosis or edema   Pulses:   2+ and symmetric all extremities   Skin:   Skin color, texture, turgor normal, no rashes or lesions   Neurologic:   CNII-XII intact, normal strength, sensation and reflexes     throughout     Impressions & Plans:    Diagnosis:  Schizoaffective Disorder Bipolar Type. F25.0    Procedure:  Bitemporal    I have discussed the risks and benefits and alternatives with the patient/family.  They understand and agree to proceed with plan of care.    Aida Drake MD  7/16/2024

## 2024-07-17 ENCOUNTER — ANESTHESIA (OUTPATIENT)
Dept: POSTOP/PACU | Facility: HOSPITAL | Age: 59
End: 2024-07-17
Payer: MEDICARE

## 2024-07-17 ENCOUNTER — ANESTHESIA EVENT (OUTPATIENT)
Dept: POSTOP/PACU | Facility: HOSPITAL | Age: 59
End: 2024-07-17
Payer: MEDICARE

## 2024-07-17 RX ORDER — KETAMINE HYDROCHLORIDE 50 MG/ML
INJECTION, SOLUTION INTRAMUSCULAR; INTRAVENOUS AS NEEDED
Status: DISCONTINUED | OUTPATIENT
Start: 2024-07-17 | End: 2024-07-17 | Stop reason: SURG

## 2024-07-17 RX ORDER — ETOMIDATE 2 MG/ML
INJECTION INTRAVENOUS AS NEEDED
Status: DISCONTINUED | OUTPATIENT
Start: 2024-07-17 | End: 2024-07-17 | Stop reason: SURG

## 2024-07-17 RX ADMIN — ETOMIDATE 16 MG: 2 INJECTION INTRAVENOUS at 06:50:00

## 2024-07-17 RX ADMIN — KETAMINE HYDROCHLORIDE 25 MG: 50 INJECTION, SOLUTION INTRAMUSCULAR; INTRAVENOUS at 06:50:00

## 2024-07-17 RX ADMIN — SODIUM CHLORIDE, SODIUM LACTATE, POTASSIUM CHLORIDE, CALCIUM CHLORIDE: 600; 310; 30; 20 INJECTION, SOLUTION INTRAVENOUS at 06:56:00

## 2024-07-17 NOTE — ANESTHESIA PREPROCEDURE EVALUATION
PRE-OP EVALUATION    Patient Name: Dylon Perla    Admit Diagnosis: Schizophrenia, unspecified type (HCC) [F20.9]    Pre-op Diagnosis: * No surgery found *        Anesthesia Procedure: ECT(BEDSIDE)    * Surgery not found *    Pre-op vitals reviewed.  Temp: 97.9 °F (36.6 °C)  Pulse: 95  Resp: 14  BP: 119/82  SpO2: 95 %  Body mass index is 25.42 kg/m².    Current medications reviewed.  Hospital Medications:   glucose (Dex4) 15 GM/59ML oral liquid 15 g  15 g Oral Q15 Min PRN    Or    glucose (Glutose) 40% oral gel 15 g  15 g Oral Q15 Min PRN    Or    glucose-vitamin C (Dex-4) chewable tab 4 tablet  4 tablet Oral Q15 Min PRN    Or    dextrose 50% injection 50 mL  50 mL Intravenous Q15 Min PRN    Or    glucose (Dex4) 15 GM/59ML oral liquid 30 g  30 g Oral Q15 Min PRN    Or    glucose (Glutose) 40% oral gel 30 g  30 g Oral Q15 Min PRN    Or    glucose-vitamin C (Dex-4) chewable tab 8 tablet  8 tablet Oral Q15 Min PRN    [COMPLETED] acetaminophen (Tylenol Extra Strength) tab 1,000 mg  1,000 mg Oral Once    lactated ringers infusion   Intravenous Continuous    [COMPLETED] glycopyrrolate (Robinul) 0.2 MG/ML injection 0.1 mg  0.1 mg Intravenous Once    [COMPLETED] ketorolac (Toradol) 30 MG/ML injection 15 mg  15 mg Intravenous Once    [COMPLETED] ondansetron (Zofran) 4 MG/2ML injection 4 mg  4 mg Intravenous Once    [COMPLETED] caffeine-sodium benzoate 125-125 mg/mL injection  750 mg Intravenous Once    [COMPLETED] hydrALAzine (Apresoline) 20 mg/mL injection 20 mg  20 mg Intravenous Once    sertraline (Zoloft) tab 100 mg  100 mg Oral Daily    traZODone (Desyrel) tab 100 mg  100 mg Oral Nightly    haloperidol (Haldol) tab 10 mg  10 mg Oral QAM    haloperidol (Haldol) tab 15 mg  15 mg Oral Nightly    cloZAPine (Clozaril) tab 100 mg  100 mg Oral Daily    cloZAPine (Clozaril) tab 600 mg  600 mg Oral Nightly    benztropine (Cogentin) tab 2 mg  2 mg Oral Q4H PRN    Or    benztropine mesylate (Cogentin) 1 mg/mL injection 2 mg  2  mg Intramuscular Q4H PRN    haloperidol (Haldol) tab 5 mg  5 mg Oral Q4H PRN    Or    haloperidol lactate (Haldol) 5 MG/ML injection 5 mg  5 mg Intramuscular Q4H PRN    LORazepam (Ativan) tab 2 mg  2 mg Oral Q4H PRN    Or    LORazepam (Ativan) 2 mg/mL injection 2 mg  2 mg Intramuscular Q4H PRN    magnesium hydroxide (Milk of Magnesia) 400 MG/5ML oral suspension 30 mL  30 mL Oral Nightly PRN    alum-mag hydroxide-simethicone (Maalox) 200-200-20 MG/5ML oral suspension 30 mL  30 mL Oral Q4H PRN    acetaminophen (Tylenol) tab 325 mg  325 mg Oral Q4H PRN    Or    acetaminophen (Tylenol) tab 650 mg  650 mg Oral Q4H PRN    propranolol (Inderal) tab 10 mg  10 mg Oral TID    topiramate (TopaMAX) tab 200 mg  200 mg Oral Daily    atorvastatin (Lipitor) tab 40 mg  40 mg Oral Nightly    lisinopril (Prinivil; Zestril) tab 20 mg  20 mg Oral Daily    metFORMIN (Glucophage) tab 500 mg  500 mg Oral Daily with breakfast       Outpatient Medications:   (Not in a hospital admission)      Allergies: Latex and Penicillins      Anesthesia Evaluation    Patient summary reviewed.    Anesthetic Complications  (-) history of anesthetic complications         GI/Hepatic/Renal                                 Cardiovascular                  (+) hypertension                                     Endo/Other      (+) diabetes                            Pulmonary                           Neuro/Psych                     (+) psychiatric history                 Past Surgical History:   Procedure Laterality Date    Colonoscopy       Social History     Socioeconomic History    Marital status: Single   Tobacco Use    Smoking status: Never    Smokeless tobacco: Never   Vaping Use    Vaping status: Never Used   Substance and Sexual Activity    Alcohol use: Never    Drug use: Never     History   Drug Use Unknown     Available pre-op labs reviewed.  Lab Results   Component Value Date    WBC 7.6 07/14/2024    RBC 4.03 (L) 07/14/2024    HGB 12.4 (L) 07/14/2024     HCT 37.0 (L) 07/14/2024    MCV 91.8 07/14/2024    MCH 30.8 07/14/2024    MCHC 33.5 07/14/2024    RDW 13.7 07/14/2024    .0 (L) 07/14/2024     Lab Results   Component Value Date     06/27/2024    K 3.6 06/27/2024     06/27/2024    CO2 28.0 06/27/2024    BUN 17 06/27/2024    CREATSERUM 1.05 06/27/2024     (H) 06/27/2024    CA 9.0 06/27/2024            Airway      Mallampati: II  Mouth opening: >3 FB  TM distance: > 6 cm  Neck ROM: full Cardiovascular      Rhythm: regular  Rate: normal     Dental             Pulmonary      Breath sounds clear to auscultation bilaterally.               Other findings              ASA: 2   Plan: general  NPO status verified and patient meets guidelines.    Post-procedure pain management plan discussed with surgeon and patient.    Comment: D/w the patient the risks and benefits of general anesthesia including sore throat, nausea, and intraoperative awareness.    Plan/risks discussed with: patient                Present on Admission:  **None**

## 2024-07-17 NOTE — ANESTHESIA POSTPROCEDURE EVALUATION
[facility]    Dylon Perla Patient Status:  Outpatient   Age/Gender 58 year old male MRN [id number]   Location [facility] POST ANESTHESIA CARE UNIT Attending Dameon Back MD   Hosp Day # 0 PCP No primary care provider on file.       Anesthesia Post-op Note        Procedure Summary       Date: 07/17/24 Room / Location: [facility] Post Anesthesia Care Unit    Anesthesia Start: 0644 Anesthesia Stop: 0716    Procedure: ECT(BEDSIDE) Diagnosis: Schizophrenia, unspecified type ([facility])    Scheduled Providers:  Anesthesiologist: Aaron Farias MD    Anesthesia Type: general ASA Status: 2            Anesthesia Type: general    Vitals Value Taken Time   /90 07/17/24 0716   Temp  07/17/24 0716   Pulse 91 07/17/24 0716   Resp 18 07/17/24 0716   SpO2 96 07/17/24 0716       Patient Location: PACU    Anesthesia Type: general    Airway Patency: patent    Postop Pain Control: adequate    Mental Status: mildly sedated but able to meaningfully participate in the post-anesthesia evaluation    Nausea/Vomiting: none    Cardiopulmonary/Hydration status: stable euvolemic    Complications: no apparent anesthesia related complications    Postop vital signs: stable    Dental Exam: Unchanged from Preop    Patient to be discharged home when criteria met.

## 2024-07-19 ENCOUNTER — HOSPITAL ENCOUNTER (OUTPATIENT)
Dept: POSTOP/PACU | Facility: HOSPITAL | Age: 59
Discharge: HOME | End: 2024-07-19
Attending: Other
Payer: MEDICARE

## 2024-07-19 ENCOUNTER — ANESTHESIA EVENT (OUTPATIENT)
Dept: POSTOP/PACU | Facility: HOSPITAL | Age: 59
End: 2024-07-19
Payer: MEDICARE

## 2024-07-19 ENCOUNTER — ANESTHESIA (OUTPATIENT)
Dept: POSTOP/PACU | Facility: HOSPITAL | Age: 59
End: 2024-07-19
Payer: MEDICARE

## 2024-07-19 VITALS
BODY MASS INDEX: 25 KG/M2 | SYSTOLIC BLOOD PRESSURE: 160 MMHG | RESPIRATION RATE: 12 BRPM | TEMPERATURE: 98 F | DIASTOLIC BLOOD PRESSURE: 93 MMHG | OXYGEN SATURATION: 95 % | HEIGHT: 74 IN | HEART RATE: 88 BPM

## 2024-07-19 DIAGNOSIS — F20.9 SCHIZOPHRENIA, UNSPECIFIED TYPE (HCC): ICD-10-CM

## 2024-07-19 RX ORDER — ONDANSETRON 2 MG/ML
INJECTION INTRAMUSCULAR; INTRAVENOUS
Status: COMPLETED
Start: 2024-07-19 | End: 2024-07-19

## 2024-07-19 RX ORDER — SODIUM CHLORIDE, SODIUM LACTATE, POTASSIUM CHLORIDE, CALCIUM CHLORIDE 600; 310; 30; 20 MG/100ML; MG/100ML; MG/100ML; MG/100ML
INJECTION, SOLUTION INTRAVENOUS CONTINUOUS
Status: DISCONTINUED | OUTPATIENT
Start: 2024-07-19 | End: 2024-07-19 | Stop reason: HOSPADM

## 2024-07-19 RX ORDER — HYDRALAZINE HYDROCHLORIDE 20 MG/ML
10 INJECTION INTRAMUSCULAR; INTRAVENOUS ONCE
Status: COMPLETED | OUTPATIENT
Start: 2024-07-19 | End: 2024-07-19

## 2024-07-19 RX ORDER — CAFFEINE AND SODIUM BENZOATE 125 MG/ML
750 INJECTION, SOLUTION INTRAMUSCULAR; INTRAVENOUS ONCE
Status: COMPLETED | OUTPATIENT
Start: 2024-07-19 | End: 2024-07-19

## 2024-07-19 RX ORDER — GLYCOPYRROLATE 0.2 MG/ML
INJECTION, SOLUTION INTRAMUSCULAR; INTRAVENOUS
Status: COMPLETED
Start: 2024-07-19 | End: 2024-07-19

## 2024-07-19 RX ORDER — HYDROMORPHONE HYDROCHLORIDE 1 MG/ML
0.4 INJECTION, SOLUTION INTRAMUSCULAR; INTRAVENOUS; SUBCUTANEOUS EVERY 5 MIN PRN
Status: DISCONTINUED | OUTPATIENT
Start: 2024-07-19 | End: 2024-07-19 | Stop reason: HOSPADM

## 2024-07-19 RX ORDER — GLYCOPYRROLATE 0.2 MG/ML
0.1 INJECTION, SOLUTION INTRAMUSCULAR; INTRAVENOUS ONCE
Status: COMPLETED | OUTPATIENT
Start: 2024-07-19 | End: 2024-07-19

## 2024-07-19 RX ORDER — KETOROLAC TROMETHAMINE 30 MG/ML
INJECTION, SOLUTION INTRAMUSCULAR; INTRAVENOUS
Status: COMPLETED
Start: 2024-07-19 | End: 2024-07-19

## 2024-07-19 RX ORDER — ETOMIDATE 2 MG/ML
INJECTION INTRAVENOUS AS NEEDED
Status: DISCONTINUED | OUTPATIENT
Start: 2024-07-19 | End: 2024-07-19 | Stop reason: SURG

## 2024-07-19 RX ORDER — HYDRALAZINE HYDROCHLORIDE 20 MG/ML
INJECTION INTRAMUSCULAR; INTRAVENOUS
Status: COMPLETED
Start: 2024-07-19 | End: 2024-07-19

## 2024-07-19 RX ORDER — HYDROMORPHONE HYDROCHLORIDE 1 MG/ML
0.6 INJECTION, SOLUTION INTRAMUSCULAR; INTRAVENOUS; SUBCUTANEOUS EVERY 5 MIN PRN
Status: DISCONTINUED | OUTPATIENT
Start: 2024-07-19 | End: 2024-07-19 | Stop reason: HOSPADM

## 2024-07-19 RX ORDER — KETAMINE HYDROCHLORIDE 50 MG/ML
INJECTION, SOLUTION INTRAMUSCULAR; INTRAVENOUS AS NEEDED
Status: DISCONTINUED | OUTPATIENT
Start: 2024-07-19 | End: 2024-07-19 | Stop reason: SURG

## 2024-07-19 RX ORDER — NALOXONE HYDROCHLORIDE 0.4 MG/ML
0.08 INJECTION, SOLUTION INTRAMUSCULAR; INTRAVENOUS; SUBCUTANEOUS AS NEEDED
Status: DISCONTINUED | OUTPATIENT
Start: 2024-07-19 | End: 2024-07-19 | Stop reason: HOSPADM

## 2024-07-19 RX ORDER — NICOTINE POLACRILEX 4 MG
30 LOZENGE BUCCAL
Status: DISCONTINUED | OUTPATIENT
Start: 2024-07-19 | End: 2024-07-21

## 2024-07-19 RX ORDER — HYDROMORPHONE HYDROCHLORIDE 1 MG/ML
0.2 INJECTION, SOLUTION INTRAMUSCULAR; INTRAVENOUS; SUBCUTANEOUS EVERY 5 MIN PRN
Status: DISCONTINUED | OUTPATIENT
Start: 2024-07-19 | End: 2024-07-19 | Stop reason: HOSPADM

## 2024-07-19 RX ORDER — DEXTROSE MONOHYDRATE 25 G/50ML
50 INJECTION, SOLUTION INTRAVENOUS
Status: DISCONTINUED | OUTPATIENT
Start: 2024-07-19 | End: 2024-07-21

## 2024-07-19 RX ORDER — NICOTINE POLACRILEX 4 MG
15 LOZENGE BUCCAL
Status: DISCONTINUED | OUTPATIENT
Start: 2024-07-19 | End: 2024-07-21

## 2024-07-19 RX ORDER — ONDANSETRON 2 MG/ML
4 INJECTION INTRAMUSCULAR; INTRAVENOUS ONCE
Status: COMPLETED | OUTPATIENT
Start: 2024-07-19 | End: 2024-07-19

## 2024-07-19 RX ORDER — PROCHLORPERAZINE EDISYLATE 5 MG/ML
5 INJECTION INTRAMUSCULAR; INTRAVENOUS EVERY 8 HOURS PRN
Status: DISCONTINUED | OUTPATIENT
Start: 2024-07-19 | End: 2024-07-19 | Stop reason: HOSPADM

## 2024-07-19 RX ORDER — SODIUM CHLORIDE, SODIUM LACTATE, POTASSIUM CHLORIDE, CALCIUM CHLORIDE 600; 310; 30; 20 MG/100ML; MG/100ML; MG/100ML; MG/100ML
INJECTION, SOLUTION INTRAVENOUS CONTINUOUS
Status: DISCONTINUED | OUTPATIENT
Start: 2024-07-19 | End: 2024-07-21

## 2024-07-19 RX ORDER — KETOROLAC TROMETHAMINE 30 MG/ML
15 INJECTION, SOLUTION INTRAMUSCULAR; INTRAVENOUS ONCE
Status: COMPLETED | OUTPATIENT
Start: 2024-07-19 | End: 2024-07-19

## 2024-07-19 RX ORDER — CAFFEINE AND SODIUM BENZOATE 125 MG/ML
INJECTION, SOLUTION INTRAMUSCULAR; INTRAVENOUS
Status: COMPLETED
Start: 2024-07-19 | End: 2024-07-19

## 2024-07-19 RX ORDER — ONDANSETRON 2 MG/ML
4 INJECTION INTRAMUSCULAR; INTRAVENOUS EVERY 6 HOURS PRN
Status: DISCONTINUED | OUTPATIENT
Start: 2024-07-19 | End: 2024-07-19 | Stop reason: HOSPADM

## 2024-07-19 RX ADMIN — ONDANSETRON 4 MG: 2 INJECTION INTRAMUSCULAR; INTRAVENOUS at 06:40:00

## 2024-07-19 RX ADMIN — GLYCOPYRROLATE 0.1 MG: 0.2 INJECTION, SOLUTION INTRAMUSCULAR; INTRAVENOUS at 06:40:00

## 2024-07-19 RX ADMIN — HYDRALAZINE HYDROCHLORIDE 10 MG: 20 INJECTION INTRAMUSCULAR; INTRAVENOUS at 06:40:00

## 2024-07-19 RX ADMIN — KETOROLAC TROMETHAMINE 15 MG: 30 INJECTION, SOLUTION INTRAMUSCULAR; INTRAVENOUS at 06:45:00

## 2024-07-19 RX ADMIN — CAFFEINE AND SODIUM BENZOATE 750 MG: 125 INJECTION, SOLUTION INTRAMUSCULAR; INTRAVENOUS at 06:45:00

## 2024-07-19 RX ADMIN — ETOMIDATE 16 MG: 2 INJECTION INTRAVENOUS at 07:20:00

## 2024-07-19 RX ADMIN — KETAMINE HYDROCHLORIDE 25 MG: 50 INJECTION, SOLUTION INTRAMUSCULAR; INTRAVENOUS at 07:20:00

## 2024-07-19 NOTE — PROGRESS NOTES
[facility]  ECT History & Physical    Dylon Perla Patient Status:  Outpatient   Age/Gender 58 year old male MRN [id number]   Location [facility] POST ANESTHESIA CARE UNIT Attending Dameon Back MD   Hosp Day # 0 PCP No primary care provider on file.     Date: 7/19/2024    Diagnosis:  ***    Procedure:  ECT    HPI:     ***      Medical History:  Past Medical History:    Diabetes (HCC)    Diabetes mellitus (HCC)    Non-insulin dependent    Essential hypertension    High blood pressure    High cholesterol    Hyperlipidemia       Surgical History:  Past Surgical History:   Procedure Laterality Date    Colonoscopy         Family History:  Family History   Family history unknown: Yes       ROS:  ***    Physical Exam:   BP (!) 160/107   Pulse 97   Temp 98.2 °F (36.8 °C) (Temporal)   Resp 15   Ht 74\"   SpO2 100%   BMI 25.42 kg/m²     General Appearance:    Alert, cooperative, no distress, appears stated age   Head:    Normocephalic, without obvious abnormality, atraumatic   Eyes:    PERRL, conjunctiva/corneas clear, EOM's intact       Nose:   Nares normal, septum midline, mucosa normal, no drainage    or sinus tenderness   Throat:   Lips, mucosa, and tongue normal; teeth and gums normal   Neck:   Supple, symmetrical, trachea midline   Lungs:     Clear to auscultation bilaterally, respirations unlabored    Heart:    Regular rate and rhythm, S1 and S2 normal, no murmur, rub   or gallop   Abdomen:     Soft, non-tender, bowel sounds active all four quadrants,     no masses, no organomegaly   Extremities:   Extremities normal, atraumatic, no cyanosis or edema   Pulses:   2+ and symmetric all extremities   Skin:   Skin color, texture, turgor normal, no rashes or lesions   Neurologic:   CNII-XII intact, normal strength, sensation and reflexes     throughout     Impressions & Plans:  ***    I have discussed the risks and benefits and alternatives with the patient/family.  They understand and  agree to proceed with plan of care.    Dameon Back MD

## 2024-07-19 NOTE — ANESTHESIA PREPROCEDURE EVALUATION
PRE-OP EVALUATION    Patient Name: Dylon Perla    Admit Diagnosis: Schizophrenia, unspecified type (HCC) [F20.9]    Pre-op Diagnosis: * No surgery found *        Anesthesia Procedure: ECT(BEDSIDE)    * Surgery not found *    Pre-op vitals reviewed.  Temp: 98 °F (36.7 °C)  Pulse: 90  Resp: 14  BP: 130/81  SpO2: 96 %  Body mass index is 25.42 kg/m².    Current medications reviewed.  Hospital Medications:   glucose (Dex4) 15 GM/59ML oral liquid 15 g  15 g Oral Q15 Min PRN    Or    glucose (Glutose) 40% oral gel 15 g  15 g Oral Q15 Min PRN    Or    glucose-vitamin C (Dex-4) chewable tab 4 tablet  4 tablet Oral Q15 Min PRN    Or    dextrose 50% injection 50 mL  50 mL Intravenous Q15 Min PRN    Or    glucose (Dex4) 15 GM/59ML oral liquid 30 g  30 g Oral Q15 Min PRN    Or    glucose (Glutose) 40% oral gel 30 g  30 g Oral Q15 Min PRN    Or    glucose-vitamin C (Dex-4) chewable tab 8 tablet  8 tablet Oral Q15 Min PRN    lactated ringers infusion   Intravenous Continuous    [COMPLETED] glycopyrrolate (Robinul) 0.2 MG/ML injection 0.1 mg  0.1 mg Intravenous Once    [COMPLETED] ketorolac (Toradol) 30 MG/ML injection 15 mg  15 mg Intravenous Once    [COMPLETED] ondansetron (Zofran) 4 MG/2ML injection 4 mg  4 mg Intravenous Once    [COMPLETED] caffeine-sodium benzoate 125-125 mg/mL injection  750 mg Intravenous Once    [COMPLETED] hydrALAzine (Apresoline) 20 mg/mL injection 10 mg  10 mg Intravenous Once    [COMPLETED] hydrALAzine (Apresoline) 20 mg/mL injection        [COMPLETED] acetaminophen (Tylenol Extra Strength) tab 1,000 mg  1,000 mg Oral Once    sertraline (Zoloft) tab 100 mg  100 mg Oral Daily    traZODone (Desyrel) tab 100 mg  100 mg Oral Nightly    haloperidol (Haldol) tab 10 mg  10 mg Oral QAM    haloperidol (Haldol) tab 15 mg  15 mg Oral Nightly    cloZAPine (Clozaril) tab 100 mg  100 mg Oral Daily    cloZAPine (Clozaril) tab 600 mg  600 mg Oral Nightly    benztropine (Cogentin) tab 2 mg  2 mg Oral Q4H PRN    Or     benztropine mesylate (Cogentin) 1 mg/mL injection 2 mg  2 mg Intramuscular Q4H PRN    haloperidol (Haldol) tab 5 mg  5 mg Oral Q4H PRN    Or    haloperidol lactate (Haldol) 5 MG/ML injection 5 mg  5 mg Intramuscular Q4H PRN    LORazepam (Ativan) tab 2 mg  2 mg Oral Q4H PRN    Or    LORazepam (Ativan) 2 mg/mL injection 2 mg  2 mg Intramuscular Q4H PRN    magnesium hydroxide (Milk of Magnesia) 400 MG/5ML oral suspension 30 mL  30 mL Oral Nightly PRN    alum-mag hydroxide-simethicone (Maalox) 200-200-20 MG/5ML oral suspension 30 mL  30 mL Oral Q4H PRN    acetaminophen (Tylenol) tab 325 mg  325 mg Oral Q4H PRN    Or    acetaminophen (Tylenol) tab 650 mg  650 mg Oral Q4H PRN    propranolol (Inderal) tab 10 mg  10 mg Oral TID    topiramate (TopaMAX) tab 200 mg  200 mg Oral Daily    atorvastatin (Lipitor) tab 40 mg  40 mg Oral Nightly    lisinopril (Prinivil; Zestril) tab 20 mg  20 mg Oral Daily    metFORMIN (Glucophage) tab 500 mg  500 mg Oral Daily with breakfast       Outpatient Medications:   (Not in a hospital admission)      Allergies: Latex and Penicillins      Anesthesia Evaluation    Patient summary reviewed.    Anesthetic Complications  (-) history of anesthetic complications         GI/Hepatic/Renal                                 Cardiovascular                  (+) hypertension                                     Endo/Other      (+) diabetes                            Pulmonary                           Neuro/Psych                     (+) psychiatric history                 Past Surgical History:   Procedure Laterality Date    Colonoscopy       Social History     Socioeconomic History    Marital status: Single   Tobacco Use    Smoking status: Never    Smokeless tobacco: Never   Vaping Use    Vaping status: Never Used   Substance and Sexual Activity    Alcohol use: Never    Drug use: Never     History   Drug Use Unknown     Available pre-op labs reviewed.  Lab Results   Component Value Date    WBC 7.6  07/14/2024    RBC 4.03 (L) 07/14/2024    HGB 12.4 (L) 07/14/2024    HCT 37.0 (L) 07/14/2024    MCV 91.8 07/14/2024    MCH 30.8 07/14/2024    MCHC 33.5 07/14/2024    RDW 13.7 07/14/2024    .0 (L) 07/14/2024     Lab Results   Component Value Date     06/27/2024    K 3.6 06/27/2024     06/27/2024    CO2 28.0 06/27/2024    BUN 17 06/27/2024    CREATSERUM 1.05 06/27/2024     (H) 06/27/2024    CA 9.0 06/27/2024            Airway      Mallampati: II  Mouth opening: >3 FB  TM distance: > 6 cm  Neck ROM: full Cardiovascular      Rhythm: regular  Rate: normal     Dental             Pulmonary      Breath sounds clear to auscultation bilaterally.               Other findings              ASA: 2   Plan: general  NPO status verified and patient meets guidelines.    Post-procedure pain management plan discussed with surgeon and patient.    Comment: D/w the patient the risks and benefits of general anesthesia including sore throat, nausea, and intraoperative awareness.    Plan/risks discussed with: patient                Present on Admission:  **None**

## 2024-07-19 NOTE — ANESTHESIA POSTPROCEDURE EVALUATION
[facility]    Dylon Perla Patient Status:  Outpatient   Age/Gender 58 year old male MRN [id number]   Location [facility] POST ANESTHESIA CARE UNIT Attending Dameon Back MD   Hosp Day # 0 PCP No primary care provider on file.       Anesthesia Post-op Note        Procedure Summary       Date: 07/19/24 Room / Location: [facility] Post Anesthesia Care Unit    Anesthesia Start: 0718 Anesthesia Stop: 0728    Procedure: ECT(BEDSIDE) Diagnosis: Schizophrenia, unspecified type ([facility])    Scheduled Providers:  Anesthesiologist: Luciano Angel MD    Anesthesia Type: general ASA Status: 2            Anesthesia Type: general    Vitals Value Taken Time   /90 07/19/24 0738   Temp 98 07/19/24 0741   Pulse 99 07/19/24 0740   Resp 22 07/19/24 0740   SpO2 100 % 07/19/24 0740   Vitals shown include unfiled device data.    Patient Location: PACU    Anesthesia Type: general    Airway Patency: patent    Postop Pain Control: adequate    Mental Status: preanesthetic baseline    Nausea/Vomiting: none    Cardiopulmonary/Hydration status: stable euvolemic    Complications: no apparent anesthesia related complications    Postop vital signs: stable    Dental Exam: Unchanged from Preop    Patient to be discharged from PACU when criteria met.

## 2024-07-19 NOTE — PROGRESS NOTES
[facility]  ECT Procedure Note    Dylon Perla Patient Status:  Outpatient   Age/Gender 58 year old male MRN [id number]   Location [facility] POST ANESTHESIA CARE UNIT Attending Dameon Back MD    Day # 0 PCP No primary care provider on file.     2024    ECT Number: ***    Diagnosis: {JEAN ECT DIAGNOSIS NEW:7200}    Type of ECT:  {ECT TYPE:7199}    Place of Service:  {inpatient / outpatient:}    Settings:   1.  Energy Percentage: { ECT SETTINGS:4416}    2.  Program:  { ECT PROGRAM:4417}    Pre-ECT Evaluation    Symptoms:  ***    Risk/Benefits:  {[facility] ECT RISK/BENEFITS:4580}    Side Effects:  {[facility] ECT SIDE EFFECTS:4581}    Exam:  Mood: {LOMG MSE MOOD 2:7203}  Affect: {LOMG MSE 2 AFFECT:684682::\"Congruent\"}  Memory:  {LOMG MSE MEMORY:121315::\"intact immediate, recent, remote\"}  Concentration:   {:014020}  Suicidal ideation: {Suicidal ideation:560688}    Prior to procedure, reviewed with treatment team correct patient, time of procedure and type of ECT.  Also reviewed with anesthesia pre-ECT medications.    Patient Monitored:  { ECT MONITORIN::\"B/P, EKG, EEG, Pulse Ox, Left Ankle Cuff\"}    Pre-ECT Medications: {Pre-ECT medications:5791}    ECT Medications:  {ECT Medications:7202::\"Succinylcholine ***\"}    Seizure Duration:  Motor: *** seconds       EEG: *** seconds    Post-ECT Condition:  { POST ECT CONDITION:4424::\"Treatment unremarkable\"}    Post-ECT Medications:  { POST ECT MEDICATIONS:4425::\"Propofol\"}    Dameon Back MD